# Patient Record
Sex: MALE | Race: WHITE | NOT HISPANIC OR LATINO | Employment: OTHER | URBAN - METROPOLITAN AREA
[De-identification: names, ages, dates, MRNs, and addresses within clinical notes are randomized per-mention and may not be internally consistent; named-entity substitution may affect disease eponyms.]

---

## 2017-01-24 ENCOUNTER — COMMUNICATION - HEALTHEAST (OUTPATIENT)
Dept: FAMILY MEDICINE | Facility: CLINIC | Age: 82
End: 2017-01-24

## 2017-02-14 ENCOUNTER — OFFICE VISIT - HEALTHEAST (OUTPATIENT)
Dept: NURSING | Facility: CLINIC | Age: 82
End: 2017-02-14

## 2017-02-14 ENCOUNTER — OFFICE VISIT - HEALTHEAST (OUTPATIENT)
Dept: FAMILY MEDICINE | Facility: CLINIC | Age: 82
End: 2017-02-14

## 2017-02-14 DIAGNOSIS — E11.9 TYPE 2 DIABETES MELLITUS WITHOUT COMPLICATION, WITHOUT LONG-TERM CURRENT USE OF INSULIN (H): ICD-10-CM

## 2017-02-14 DIAGNOSIS — N40.0 BENIGN PROSTATIC HYPERPLASIA, PRESENCE OF LOWER URINARY TRACT SYMPTOMS UNSPECIFIED, UNSPECIFIED MORPHOLOGY: ICD-10-CM

## 2017-02-14 DIAGNOSIS — E78.00 HYPERCHOLESTEREMIA: ICD-10-CM

## 2017-02-14 DIAGNOSIS — E11.9 DIABETES MELLITUS, TYPE II (H): ICD-10-CM

## 2017-02-14 DIAGNOSIS — J44.89 CHRONIC AIRWAY OBSTRUCTION, NOT ELSEWHERE CLASSIFIED: ICD-10-CM

## 2017-02-14 DIAGNOSIS — I25.10 CAD (CORONARY ARTERY DISEASE): ICD-10-CM

## 2017-02-14 DIAGNOSIS — I25.83 CORONARY ATHEROSCLEROSIS DUE TO LIPID RICH PLAQUE: ICD-10-CM

## 2017-02-14 DIAGNOSIS — I10 ESSENTIAL HYPERTENSION WITH GOAL BLOOD PRESSURE LESS THAN 140/90: ICD-10-CM

## 2017-02-14 LAB — HBA1C MFR BLD: 7 % (ref 3.5–6.1)

## 2017-02-14 ASSESSMENT — MIFFLIN-ST. JEOR: SCORE: 1604.79

## 2017-02-22 ENCOUNTER — COMMUNICATION - HEALTHEAST (OUTPATIENT)
Dept: FAMILY MEDICINE | Facility: CLINIC | Age: 82
End: 2017-02-22

## 2017-02-22 DIAGNOSIS — N40.0 BPH (BENIGN PROSTATIC HYPERPLASIA): ICD-10-CM

## 2017-03-11 ENCOUNTER — COMMUNICATION - HEALTHEAST (OUTPATIENT)
Dept: FAMILY MEDICINE | Facility: CLINIC | Age: 82
End: 2017-03-11

## 2017-03-11 DIAGNOSIS — N40.0 BPH (BENIGN PROSTATIC HYPERPLASIA): ICD-10-CM

## 2017-04-20 ENCOUNTER — RECORDS - HEALTHEAST (OUTPATIENT)
Dept: ADMINISTRATIVE | Facility: OTHER | Age: 82
End: 2017-04-20

## 2017-06-14 ENCOUNTER — COMMUNICATION - HEALTHEAST (OUTPATIENT)
Dept: FAMILY MEDICINE | Facility: CLINIC | Age: 82
End: 2017-06-14

## 2017-06-14 DIAGNOSIS — N40.0 BPH (BENIGN PROSTATIC HYPERPLASIA): ICD-10-CM

## 2017-06-18 RX ORDER — TAMSULOSIN HYDROCHLORIDE 0.4 MG/1
CAPSULE ORAL
Qty: 90 CAPSULE | Refills: 3 | Status: SHIPPED | OUTPATIENT
Start: 2017-06-18

## 2017-10-30 ENCOUNTER — AMBULATORY - HEALTHEAST (OUTPATIENT)
Dept: NURSING | Facility: CLINIC | Age: 82
End: 2017-10-30

## 2017-10-30 DIAGNOSIS — Z23 NEED FOR IMMUNIZATION AGAINST INFLUENZA: ICD-10-CM

## 2018-02-26 ENCOUNTER — COMMUNICATION - HEALTHEAST (OUTPATIENT)
Dept: FAMILY MEDICINE | Facility: CLINIC | Age: 83
End: 2018-02-26

## 2018-02-26 DIAGNOSIS — I25.10 CAD (CORONARY ARTERY DISEASE): ICD-10-CM

## 2018-02-26 DIAGNOSIS — N40.0 BPH (BENIGN PROSTATIC HYPERPLASIA): ICD-10-CM

## 2018-03-05 ENCOUNTER — OFFICE VISIT - HEALTHEAST (OUTPATIENT)
Dept: CARDIOLOGY | Facility: CLINIC | Age: 83
End: 2018-03-05

## 2018-03-05 DIAGNOSIS — J43.8 OTHER EMPHYSEMA (H): ICD-10-CM

## 2018-03-05 DIAGNOSIS — E78.00 HYPERCHOLESTEREMIA: ICD-10-CM

## 2018-03-05 DIAGNOSIS — I25.83 CORONARY ARTERY DISEASE DUE TO LIPID RICH PLAQUE: ICD-10-CM

## 2018-03-05 DIAGNOSIS — I25.10 CORONARY ARTERY DISEASE DUE TO LIPID RICH PLAQUE: ICD-10-CM

## 2018-03-05 DIAGNOSIS — I71.40 ANEURYSM OF ABDOMINAL VESSEL (H): ICD-10-CM

## 2018-03-05 DIAGNOSIS — I10 ESSENTIAL HYPERTENSION: ICD-10-CM

## 2018-03-05 LAB
ANION GAP SERPL CALCULATED.3IONS-SCNC: 9 MMOL/L (ref 5–18)
ATRIAL RATE - MUSE: 102 BPM
BUN SERPL-MCNC: 14 MG/DL (ref 8–28)
CALCIUM SERPL-MCNC: 8.7 MG/DL (ref 8.5–10.5)
CHLORIDE BLD-SCNC: 104 MMOL/L (ref 98–107)
CHOLEST SERPL-MCNC: 102 MG/DL
CO2 SERPL-SCNC: 25 MMOL/L (ref 22–31)
CREAT SERPL-MCNC: 0.92 MG/DL (ref 0.7–1.3)
DIASTOLIC BLOOD PRESSURE - MUSE: NORMAL MMHG
FASTING STATUS PATIENT QL REPORTED: NO
GFR SERPL CREATININE-BSD FRML MDRD: >60 ML/MIN/1.73M2
GLUCOSE BLD-MCNC: 176 MG/DL (ref 70–125)
HDLC SERPL-MCNC: 40 MG/DL
INTERPRETATION ECG - MUSE: NORMAL
LDLC SERPL CALC-MCNC: 24 MG/DL
P AXIS - MUSE: 82 DEGREES
POTASSIUM BLD-SCNC: 4.4 MMOL/L (ref 3.5–5)
PR INTERVAL - MUSE: 162 MS
QRS DURATION - MUSE: 142 MS
QT - MUSE: 392 MS
QTC - MUSE: 510 MS
R AXIS - MUSE: 78 DEGREES
SODIUM SERPL-SCNC: 138 MMOL/L (ref 136–145)
SYSTOLIC BLOOD PRESSURE - MUSE: NORMAL MMHG
T AXIS - MUSE: 11 DEGREES
TRIGL SERPL-MCNC: 189 MG/DL
VENTRICULAR RATE- MUSE: 102 BPM

## 2018-03-05 ASSESSMENT — MIFFLIN-ST. JEOR: SCORE: 1577.58

## 2018-03-06 ENCOUNTER — COMMUNICATION - HEALTHEAST (OUTPATIENT)
Dept: CARDIOLOGY | Facility: CLINIC | Age: 83
End: 2018-03-06

## 2018-03-09 ENCOUNTER — OFFICE VISIT - HEALTHEAST (OUTPATIENT)
Dept: NURSING | Facility: CLINIC | Age: 83
End: 2018-03-09

## 2018-03-09 ENCOUNTER — OFFICE VISIT - HEALTHEAST (OUTPATIENT)
Dept: FAMILY MEDICINE | Facility: CLINIC | Age: 83
End: 2018-03-09

## 2018-03-09 DIAGNOSIS — J44.9 CHRONIC OBSTRUCTIVE PULMONARY DISEASE, UNSPECIFIED COPD TYPE (H): ICD-10-CM

## 2018-03-09 DIAGNOSIS — E78.00 HYPERCHOLESTEREMIA: ICD-10-CM

## 2018-03-09 DIAGNOSIS — I25.10 CORONARY ARTERY DISEASE DUE TO LIPID RICH PLAQUE: ICD-10-CM

## 2018-03-09 DIAGNOSIS — I25.83 CORONARY ARTERY DISEASE DUE TO LIPID RICH PLAQUE: ICD-10-CM

## 2018-03-09 DIAGNOSIS — E11.9 DIABETES (H): ICD-10-CM

## 2018-03-09 DIAGNOSIS — N40.0 BENIGN PROSTATIC HYPERPLASIA, UNSPECIFIED WHETHER LOWER URINARY TRACT SYMPTOMS PRESENT: ICD-10-CM

## 2018-03-09 DIAGNOSIS — E11.9 TYPE 2 DIABETES MELLITUS WITHOUT COMPLICATION, WITHOUT LONG-TERM CURRENT USE OF INSULIN (H): ICD-10-CM

## 2018-03-09 DIAGNOSIS — L98.9 SKIN ABNORMALITIES: ICD-10-CM

## 2018-03-09 LAB — HBA1C MFR BLD: 7.8 % (ref 3.5–6.1)

## 2018-03-16 ENCOUNTER — RECORDS - HEALTHEAST (OUTPATIENT)
Dept: ADMINISTRATIVE | Facility: OTHER | Age: 83
End: 2018-03-16

## 2018-03-27 ENCOUNTER — COMMUNICATION - HEALTHEAST (OUTPATIENT)
Dept: FAMILY MEDICINE | Facility: CLINIC | Age: 83
End: 2018-03-27

## 2018-03-27 DIAGNOSIS — I25.10 CAD (CORONARY ARTERY DISEASE): ICD-10-CM

## 2018-03-27 DIAGNOSIS — N40.0 BPH (BENIGN PROSTATIC HYPERPLASIA): ICD-10-CM

## 2018-04-17 ENCOUNTER — RECORDS - HEALTHEAST (OUTPATIENT)
Dept: ADMINISTRATIVE | Facility: OTHER | Age: 83
End: 2018-04-17

## 2018-04-26 ENCOUNTER — COMMUNICATION - HEALTHEAST (OUTPATIENT)
Dept: FAMILY MEDICINE | Facility: CLINIC | Age: 83
End: 2018-04-26

## 2018-04-26 DIAGNOSIS — I25.10 CAD (CORONARY ARTERY DISEASE): ICD-10-CM

## 2018-06-22 ENCOUNTER — AMBULATORY - HEALTHEAST (OUTPATIENT)
Dept: LAB | Facility: CLINIC | Age: 83
End: 2018-06-22

## 2018-06-22 ENCOUNTER — OFFICE VISIT - HEALTHEAST (OUTPATIENT)
Dept: PHARMACY | Facility: CLINIC | Age: 83
End: 2018-06-22

## 2018-06-22 DIAGNOSIS — I25.83 CORONARY ARTERY DISEASE DUE TO LIPID RICH PLAQUE: ICD-10-CM

## 2018-06-22 DIAGNOSIS — E11.9 TYPE 2 DIABETES MELLITUS WITHOUT COMPLICATION, WITHOUT LONG-TERM CURRENT USE OF INSULIN (H): ICD-10-CM

## 2018-06-22 DIAGNOSIS — E11.9 DIABETES (H): ICD-10-CM

## 2018-06-22 DIAGNOSIS — I25.10 CORONARY ARTERY DISEASE DUE TO LIPID RICH PLAQUE: ICD-10-CM

## 2018-06-22 DIAGNOSIS — N40.0 BENIGN PROSTATIC HYPERPLASIA, UNSPECIFIED WHETHER LOWER URINARY TRACT SYMPTOMS PRESENT: ICD-10-CM

## 2018-06-22 DIAGNOSIS — E11.9 DIABETES MELLITUS (H): ICD-10-CM

## 2018-06-22 DIAGNOSIS — J44.9 CHRONIC OBSTRUCTIVE PULMONARY DISEASE, UNSPECIFIED COPD TYPE (H): ICD-10-CM

## 2018-06-22 LAB
CREAT UR-MCNC: 113 MG/DL
HBA1C MFR BLD: 7.8 % (ref 3.5–6.1)
MICROALBUMIN UR-MCNC: 4.14 MG/DL (ref 0–1.99)
MICROALBUMIN/CREAT UR: 36.6 MG/G

## 2018-06-25 LAB — HBA1C MFR BLD: NORMAL % (ref 3.5–6.1)

## 2018-12-31 ENCOUNTER — HOME CARE/HOSPICE - HEALTHEAST (OUTPATIENT)
Dept: HOSPICE | Facility: HOSPICE | Age: 83
End: 2018-12-31

## 2019-01-02 ENCOUNTER — HOME CARE/HOSPICE - HEALTHEAST (OUTPATIENT)
Dept: HOSPICE | Facility: HOSPICE | Age: 84
End: 2019-01-02

## 2019-01-04 ENCOUNTER — OFFICE VISIT - HEALTHEAST (OUTPATIENT)
Dept: GERIATRICS | Facility: CLINIC | Age: 84
End: 2019-01-04

## 2019-01-04 DIAGNOSIS — E78.00 HYPERCHOLESTEREMIA: ICD-10-CM

## 2019-01-04 DIAGNOSIS — I25.83 CORONARY ARTERY DISEASE DUE TO LIPID RICH PLAQUE: ICD-10-CM

## 2019-01-04 DIAGNOSIS — I10 ESSENTIAL HYPERTENSION: ICD-10-CM

## 2019-01-04 DIAGNOSIS — J10.1 INFLUENZA A H1N1 INFECTION: ICD-10-CM

## 2019-01-04 DIAGNOSIS — J44.1 CHRONIC OBSTRUCTIVE PULMONARY DISEASE WITH ACUTE EXACERBATION (H): ICD-10-CM

## 2019-01-04 DIAGNOSIS — I25.10 CORONARY ARTERY DISEASE DUE TO LIPID RICH PLAQUE: ICD-10-CM

## 2019-01-04 DIAGNOSIS — I47.19 ATRIAL TACHYCARDIA, MULTIFOCAL (H): ICD-10-CM

## 2019-01-04 DIAGNOSIS — R53.81 PHYSICAL DECONDITIONING: ICD-10-CM

## 2019-01-04 DIAGNOSIS — I71.40 ANEURYSM OF ABDOMINAL VESSEL (H): ICD-10-CM

## 2019-01-06 ENCOUNTER — OFFICE VISIT - HEALTHEAST (OUTPATIENT)
Dept: GERIATRICS | Facility: CLINIC | Age: 84
End: 2019-01-06

## 2019-01-06 DIAGNOSIS — J10.1 INFLUENZA A: ICD-10-CM

## 2019-01-06 DIAGNOSIS — I47.19 ATRIAL TACHYCARDIA, MULTIFOCAL (H): ICD-10-CM

## 2019-01-06 DIAGNOSIS — J44.1 CHRONIC OBSTRUCTIVE PULMONARY DISEASE WITH ACUTE EXACERBATION (H): ICD-10-CM

## 2019-01-06 DIAGNOSIS — I10 ESSENTIAL HYPERTENSION: ICD-10-CM

## 2019-01-07 ENCOUNTER — RECORDS - HEALTHEAST (OUTPATIENT)
Dept: LAB | Facility: CLINIC | Age: 84
End: 2019-01-07

## 2019-01-07 LAB
ANION GAP SERPL CALCULATED.3IONS-SCNC: 11 MMOL/L (ref 5–18)
BUN SERPL-MCNC: 18 MG/DL (ref 8–28)
CALCIUM SERPL-MCNC: 8.3 MG/DL (ref 8.5–10.5)
CHLORIDE BLD-SCNC: 105 MMOL/L (ref 98–107)
CO2 SERPL-SCNC: 24 MMOL/L (ref 22–31)
CREAT SERPL-MCNC: 0.84 MG/DL (ref 0.7–1.3)
ERYTHROCYTE [DISTWIDTH] IN BLOOD BY AUTOMATED COUNT: 14.4 % (ref 11–14.5)
GFR SERPL CREATININE-BSD FRML MDRD: >60 ML/MIN/1.73M2
GLUCOSE BLD-MCNC: 265 MG/DL (ref 70–125)
HCT VFR BLD AUTO: 46.8 % (ref 40–54)
HGB BLD-MCNC: 15.4 G/DL (ref 14–18)
MCH RBC QN AUTO: 30.3 PG (ref 27–34)
MCHC RBC AUTO-ENTMCNC: 32.9 G/DL (ref 32–36)
MCV RBC AUTO: 92 FL (ref 80–100)
PLATELET # BLD AUTO: 182 THOU/UL (ref 140–440)
PMV BLD AUTO: 10.5 FL (ref 8.5–12.5)
POTASSIUM BLD-SCNC: 3.6 MMOL/L (ref 3.5–5)
RBC # BLD AUTO: 5.09 MILL/UL (ref 4.4–6.2)
SODIUM SERPL-SCNC: 140 MMOL/L (ref 136–145)
VIT B12 SERPL-MCNC: 630 PG/ML (ref 213–816)
WBC: 13.1 THOU/UL (ref 4–11)

## 2019-01-08 ENCOUNTER — OFFICE VISIT - HEALTHEAST (OUTPATIENT)
Dept: GERIATRICS | Facility: CLINIC | Age: 84
End: 2019-01-08

## 2019-01-08 DIAGNOSIS — E11.65 TYPE 2 DIABETES MELLITUS WITH HYPERGLYCEMIA, WITHOUT LONG-TERM CURRENT USE OF INSULIN (H): ICD-10-CM

## 2019-01-08 DIAGNOSIS — R53.81 PHYSICAL DECONDITIONING: ICD-10-CM

## 2019-01-08 DIAGNOSIS — I10 ESSENTIAL HYPERTENSION: ICD-10-CM

## 2019-01-08 DIAGNOSIS — J44.1 CHRONIC OBSTRUCTIVE PULMONARY DISEASE WITH ACUTE EXACERBATION (H): ICD-10-CM

## 2019-01-08 DIAGNOSIS — J10.1 INFLUENZA A: ICD-10-CM

## 2019-01-08 LAB — 25(OH)D3 SERPL-MCNC: 9.2 NG/ML (ref 30–80)

## 2019-01-09 ENCOUNTER — OFFICE VISIT - HEALTHEAST (OUTPATIENT)
Dept: GERIATRICS | Facility: CLINIC | Age: 84
End: 2019-01-09

## 2019-01-09 DIAGNOSIS — I10 ESSENTIAL HYPERTENSION: ICD-10-CM

## 2019-01-09 DIAGNOSIS — Z51.5 PALLIATIVE CARE PATIENT: ICD-10-CM

## 2019-01-09 DIAGNOSIS — J10.1 INFLUENZA A H1N1 INFECTION: ICD-10-CM

## 2019-01-09 DIAGNOSIS — J43.8 OTHER EMPHYSEMA (H): ICD-10-CM

## 2019-01-09 DIAGNOSIS — E11.65 TYPE 2 DIABETES MELLITUS WITH HYPERGLYCEMIA, WITHOUT LONG-TERM CURRENT USE OF INSULIN (H): ICD-10-CM

## 2019-01-10 ENCOUNTER — HOME CARE/HOSPICE - HEALTHEAST (OUTPATIENT)
Dept: HOSPICE | Facility: HOSPICE | Age: 84
End: 2019-01-10

## 2019-01-11 ENCOUNTER — OFFICE VISIT - HEALTHEAST (OUTPATIENT)
Dept: GERIATRICS | Facility: CLINIC | Age: 84
End: 2019-01-11

## 2019-01-11 DIAGNOSIS — N40.1 BENIGN PROSTATIC HYPERPLASIA WITH URINARY RETENTION: ICD-10-CM

## 2019-01-11 DIAGNOSIS — I10 ESSENTIAL HYPERTENSION: ICD-10-CM

## 2019-01-11 DIAGNOSIS — R33.8 BENIGN PROSTATIC HYPERPLASIA WITH URINARY RETENTION: ICD-10-CM

## 2019-01-11 DIAGNOSIS — E11.65 TYPE 2 DIABETES MELLITUS WITH HYPERGLYCEMIA, WITHOUT LONG-TERM CURRENT USE OF INSULIN (H): ICD-10-CM

## 2019-01-11 DIAGNOSIS — R53.81 PHYSICAL DECONDITIONING: ICD-10-CM

## 2019-01-11 DIAGNOSIS — F51.02 ADJUSTMENT INSOMNIA: ICD-10-CM

## 2019-01-11 DIAGNOSIS — J44.1 CHRONIC OBSTRUCTIVE PULMONARY DISEASE WITH ACUTE EXACERBATION (H): ICD-10-CM

## 2019-01-11 DIAGNOSIS — J10.1 INFLUENZA A: ICD-10-CM

## 2019-01-16 ENCOUNTER — COMMUNICATION - HEALTHEAST (OUTPATIENT)
Dept: FAMILY MEDICINE | Facility: CLINIC | Age: 84
End: 2019-01-16

## 2019-01-16 ENCOUNTER — COMMUNICATION - HEALTHEAST (OUTPATIENT)
Dept: GERIATRICS | Facility: CLINIC | Age: 84
End: 2019-01-16

## 2019-01-16 ENCOUNTER — AMBULATORY - HEALTHEAST (OUTPATIENT)
Dept: GERIATRICS | Facility: CLINIC | Age: 84
End: 2019-01-16

## 2019-01-21 ENCOUNTER — COMMUNICATION - HEALTHEAST (OUTPATIENT)
Dept: FAMILY MEDICINE | Facility: CLINIC | Age: 84
End: 2019-01-21

## 2019-01-22 ENCOUNTER — COMMUNICATION - HEALTHEAST (OUTPATIENT)
Dept: FAMILY MEDICINE | Facility: CLINIC | Age: 84
End: 2019-01-22

## 2019-01-24 ENCOUNTER — OFFICE VISIT - HEALTHEAST (OUTPATIENT)
Dept: FAMILY MEDICINE | Facility: CLINIC | Age: 84
End: 2019-01-24

## 2019-01-24 DIAGNOSIS — I47.19 ATRIAL TACHYCARDIA, MULTIFOCAL (H): ICD-10-CM

## 2019-01-24 DIAGNOSIS — E11.65 TYPE 2 DIABETES MELLITUS WITH HYPERGLYCEMIA, WITHOUT LONG-TERM CURRENT USE OF INSULIN (H): ICD-10-CM

## 2019-01-24 DIAGNOSIS — I71.40 ANEURYSM OF ABDOMINAL VESSEL (H): ICD-10-CM

## 2019-01-24 DIAGNOSIS — I25.10 CORONARY ARTERY DISEASE DUE TO LIPID RICH PLAQUE: ICD-10-CM

## 2019-01-24 DIAGNOSIS — I25.83 CORONARY ARTERY DISEASE DUE TO LIPID RICH PLAQUE: ICD-10-CM

## 2019-01-24 DIAGNOSIS — Z92.89 HISTORY OF RECENT HOSPITALIZATION: ICD-10-CM

## 2019-01-24 DIAGNOSIS — J44.9 CHRONIC OBSTRUCTIVE PULMONARY DISEASE, UNSPECIFIED COPD TYPE (H): ICD-10-CM

## 2019-01-25 ENCOUNTER — RECORDS - HEALTHEAST (OUTPATIENT)
Dept: ADMINISTRATIVE | Facility: OTHER | Age: 84
End: 2019-01-25

## 2019-01-25 ENCOUNTER — COMMUNICATION - HEALTHEAST (OUTPATIENT)
Dept: FAMILY MEDICINE | Facility: CLINIC | Age: 84
End: 2019-01-25

## 2019-02-13 ENCOUNTER — RECORDS - HEALTHEAST (OUTPATIENT)
Dept: ADMINISTRATIVE | Facility: OTHER | Age: 84
End: 2019-02-13

## 2019-04-08 ENCOUNTER — OFFICE VISIT - HEALTHEAST (OUTPATIENT)
Dept: CARDIOLOGY | Facility: CLINIC | Age: 84
End: 2019-04-08

## 2019-04-08 DIAGNOSIS — E78.00 HYPERCHOLESTEREMIA: ICD-10-CM

## 2019-04-08 DIAGNOSIS — I10 ESSENTIAL HYPERTENSION: ICD-10-CM

## 2019-04-08 DIAGNOSIS — E11.65 TYPE 2 DIABETES MELLITUS WITH HYPERGLYCEMIA, WITHOUT LONG-TERM CURRENT USE OF INSULIN (H): ICD-10-CM

## 2019-04-08 DIAGNOSIS — F03.90 DEMENTIA WITHOUT BEHAVIORAL DISTURBANCE (H): ICD-10-CM

## 2019-04-08 DIAGNOSIS — I25.10 CORONARY ARTERY DISEASE DUE TO LIPID RICH PLAQUE: ICD-10-CM

## 2019-04-08 DIAGNOSIS — I25.83 CORONARY ARTERY DISEASE DUE TO LIPID RICH PLAQUE: ICD-10-CM

## 2019-04-08 DIAGNOSIS — I47.19 ATRIAL TACHYCARDIA, MULTIFOCAL (H): ICD-10-CM

## 2019-04-08 DIAGNOSIS — J44.9 CHRONIC OBSTRUCTIVE PULMONARY DISEASE, UNSPECIFIED COPD TYPE (H): ICD-10-CM

## 2019-04-08 DIAGNOSIS — I71.40 ANEURYSM OF ABDOMINAL VESSEL (H): ICD-10-CM

## 2019-04-08 ASSESSMENT — MIFFLIN-ST. JEOR: SCORE: 1545.82

## 2019-06-15 ENCOUNTER — COMMUNICATION - HEALTHEAST (OUTPATIENT)
Dept: FAMILY MEDICINE | Facility: CLINIC | Age: 84
End: 2019-06-15

## 2019-06-15 DIAGNOSIS — I25.10 CAD (CORONARY ARTERY DISEASE): ICD-10-CM

## 2019-06-19 RX ORDER — ATORVASTATIN CALCIUM 40 MG/1
TABLET, FILM COATED ORAL
Qty: 90 TABLET | Refills: 3 | Status: SHIPPED | OUTPATIENT
Start: 2019-06-19

## 2019-08-21 ENCOUNTER — OFFICE VISIT - HEALTHEAST (OUTPATIENT)
Dept: FAMILY MEDICINE | Facility: CLINIC | Age: 84
End: 2019-08-21

## 2019-08-21 DIAGNOSIS — Z00.00 MEDICARE ANNUAL WELLNESS VISIT, SUBSEQUENT: ICD-10-CM

## 2019-08-21 DIAGNOSIS — N40.1 BENIGN PROSTATIC HYPERPLASIA WITH URINARY RETENTION: ICD-10-CM

## 2019-08-21 DIAGNOSIS — I25.10 CORONARY ARTERY DISEASE DUE TO LIPID RICH PLAQUE: ICD-10-CM

## 2019-08-21 DIAGNOSIS — J44.9 CHRONIC OBSTRUCTIVE PULMONARY DISEASE, UNSPECIFIED COPD TYPE (H): ICD-10-CM

## 2019-08-21 DIAGNOSIS — I71.40 ANEURYSM OF ABDOMINAL VESSEL (H): ICD-10-CM

## 2019-08-21 DIAGNOSIS — I25.83 CORONARY ARTERY DISEASE DUE TO LIPID RICH PLAQUE: ICD-10-CM

## 2019-08-21 DIAGNOSIS — I47.19 ATRIAL TACHYCARDIA, MULTIFOCAL (H): ICD-10-CM

## 2019-08-21 DIAGNOSIS — E78.00 HYPERCHOLESTEREMIA: ICD-10-CM

## 2019-08-21 DIAGNOSIS — I10 ESSENTIAL HYPERTENSION: ICD-10-CM

## 2019-08-21 DIAGNOSIS — F03.90 DEMENTIA WITHOUT BEHAVIORAL DISTURBANCE, UNSPECIFIED DEMENTIA TYPE: ICD-10-CM

## 2019-08-21 DIAGNOSIS — R33.8 BENIGN PROSTATIC HYPERPLASIA WITH URINARY RETENTION: ICD-10-CM

## 2019-08-21 DIAGNOSIS — E11.65 TYPE 2 DIABETES MELLITUS WITH HYPERGLYCEMIA, WITHOUT LONG-TERM CURRENT USE OF INSULIN (H): ICD-10-CM

## 2019-08-21 LAB
ANION GAP SERPL CALCULATED.3IONS-SCNC: 9 MMOL/L (ref 5–18)
BUN SERPL-MCNC: 12 MG/DL (ref 8–28)
CALCIUM SERPL-MCNC: 8.9 MG/DL (ref 8.5–10.5)
CHLORIDE BLD-SCNC: 106 MMOL/L (ref 98–107)
CO2 SERPL-SCNC: 26 MMOL/L (ref 22–31)
CREAT SERPL-MCNC: 0.98 MG/DL (ref 0.7–1.3)
GFR SERPL CREATININE-BSD FRML MDRD: >60 ML/MIN/1.73M2
GLUCOSE BLD-MCNC: 195 MG/DL (ref 70–125)
HBA1C MFR BLD: 8 % (ref 3.5–6)
POTASSIUM BLD-SCNC: 4.7 MMOL/L (ref 3.5–5)
SODIUM SERPL-SCNC: 141 MMOL/L (ref 136–145)

## 2019-08-21 RX ORDER — BRIMONIDINE TARTRATE 1.5 MG/ML
1 SOLUTION/ DROPS OPHTHALMIC 2 TIMES DAILY
Refills: 11 | Status: SHIPPED | COMMUNITY
Start: 2019-08-04

## 2019-08-21 ASSESSMENT — MIFFLIN-ST. JEOR: SCORE: 1538.11

## 2019-08-22 ENCOUNTER — COMMUNICATION - HEALTHEAST (OUTPATIENT)
Dept: PEDIATRICS | Facility: CLINIC | Age: 84
End: 2019-08-22

## 2019-10-25 ENCOUNTER — DOCUMENTATION ONLY (OUTPATIENT)
Dept: OTHER | Facility: CLINIC | Age: 84
End: 2019-10-25

## 2019-10-25 ENCOUNTER — AMBULATORY - HEALTHEAST (OUTPATIENT)
Dept: OTHER | Facility: CLINIC | Age: 84
End: 2019-10-25

## 2020-01-10 ENCOUNTER — OFFICE VISIT - HEALTHEAST (OUTPATIENT)
Dept: GERIATRICS | Facility: CLINIC | Age: 85
End: 2020-01-10

## 2020-01-10 DIAGNOSIS — L03.114 CELLULITIS OF LEFT UPPER EXTREMITY: ICD-10-CM

## 2020-01-10 DIAGNOSIS — Z71.89 ACP (ADVANCE CARE PLANNING): ICD-10-CM

## 2020-01-10 DIAGNOSIS — F03.90 DEMENTIA WITHOUT BEHAVIORAL DISTURBANCE, UNSPECIFIED DEMENTIA TYPE: ICD-10-CM

## 2020-01-12 ENCOUNTER — RECORDS - HEALTHEAST (OUTPATIENT)
Dept: LAB | Facility: CLINIC | Age: 85
End: 2020-01-12

## 2020-01-13 ENCOUNTER — COMMUNICATION - HEALTHEAST (OUTPATIENT)
Dept: GERIATRICS | Facility: CLINIC | Age: 85
End: 2020-01-13

## 2020-01-13 LAB
ANION GAP SERPL CALCULATED.3IONS-SCNC: 7 MMOL/L (ref 5–18)
BUN SERPL-MCNC: 11 MG/DL (ref 8–28)
CALCIUM SERPL-MCNC: 8.6 MG/DL (ref 8.5–10.5)
CHLORIDE BLD-SCNC: 110 MMOL/L (ref 98–107)
CO2 SERPL-SCNC: 26 MMOL/L (ref 22–31)
CREAT SERPL-MCNC: 0.88 MG/DL (ref 0.7–1.3)
ERYTHROCYTE [DISTWIDTH] IN BLOOD BY AUTOMATED COUNT: 14.1 % (ref 11–14.5)
GFR SERPL CREATININE-BSD FRML MDRD: >60 ML/MIN/1.73M2
GLUCOSE BLD-MCNC: 144 MG/DL (ref 70–125)
HCT VFR BLD AUTO: 43.7 % (ref 40–54)
HGB BLD-MCNC: 14.2 G/DL (ref 14–18)
MAGNESIUM SERPL-MCNC: 1.9 MG/DL (ref 1.8–2.6)
MCH RBC QN AUTO: 30 PG (ref 27–34)
MCHC RBC AUTO-ENTMCNC: 32.5 G/DL (ref 32–36)
MCV RBC AUTO: 92 FL (ref 80–100)
PLATELET # BLD AUTO: 197 THOU/UL (ref 140–440)
PMV BLD AUTO: 10.3 FL (ref 8.5–12.5)
POTASSIUM BLD-SCNC: 3.8 MMOL/L (ref 3.5–5)
RBC # BLD AUTO: 4.74 MILL/UL (ref 4.4–6.2)
SODIUM SERPL-SCNC: 143 MMOL/L (ref 136–145)
WBC: 7.8 THOU/UL (ref 4–11)

## 2020-01-14 ENCOUNTER — OFFICE VISIT - HEALTHEAST (OUTPATIENT)
Dept: GERIATRICS | Facility: CLINIC | Age: 85
End: 2020-01-14

## 2020-01-14 DIAGNOSIS — E11.65 TYPE 2 DIABETES MELLITUS WITH HYPERGLYCEMIA, WITHOUT LONG-TERM CURRENT USE OF INSULIN (H): ICD-10-CM

## 2020-01-14 DIAGNOSIS — I10 ESSENTIAL HYPERTENSION: ICD-10-CM

## 2020-01-14 DIAGNOSIS — F03.90 DEMENTIA WITHOUT BEHAVIORAL DISTURBANCE, UNSPECIFIED DEMENTIA TYPE: ICD-10-CM

## 2020-01-14 DIAGNOSIS — L03.114 CELLULITIS OF LEFT UPPER EXTREMITY: ICD-10-CM

## 2020-01-16 ENCOUNTER — OFFICE VISIT - HEALTHEAST (OUTPATIENT)
Dept: GERIATRICS | Facility: CLINIC | Age: 85
End: 2020-01-16

## 2020-01-16 DIAGNOSIS — L03.114 CELLULITIS OF LEFT UPPER EXTREMITY: ICD-10-CM

## 2020-01-16 DIAGNOSIS — F03.90 DEMENTIA WITHOUT BEHAVIORAL DISTURBANCE, UNSPECIFIED DEMENTIA TYPE: ICD-10-CM

## 2020-01-20 ENCOUNTER — RECORDS - HEALTHEAST (OUTPATIENT)
Dept: LAB | Facility: CLINIC | Age: 85
End: 2020-01-20

## 2020-01-20 ENCOUNTER — OFFICE VISIT - HEALTHEAST (OUTPATIENT)
Dept: GERIATRICS | Facility: CLINIC | Age: 85
End: 2020-01-20

## 2020-01-20 DIAGNOSIS — F03.90 DEMENTIA WITHOUT BEHAVIORAL DISTURBANCE, UNSPECIFIED DEMENTIA TYPE: ICD-10-CM

## 2020-01-20 DIAGNOSIS — L03.114 CELLULITIS OF LEFT UPPER EXTREMITY: ICD-10-CM

## 2020-01-20 DIAGNOSIS — I10 ESSENTIAL HYPERTENSION: ICD-10-CM

## 2020-01-20 RX ORDER — METOPROLOL SUCCINATE 25 MG/1
12.5 TABLET, EXTENDED RELEASE ORAL DAILY
Status: SHIPPED | COMMUNITY
Start: 2020-01-20

## 2020-01-21 LAB
ANION GAP SERPL CALCULATED.3IONS-SCNC: 6 MMOL/L (ref 5–18)
BUN SERPL-MCNC: 14 MG/DL (ref 8–28)
CALCIUM SERPL-MCNC: 9 MG/DL (ref 8.5–10.5)
CHLORIDE BLD-SCNC: 106 MMOL/L (ref 98–107)
CO2 SERPL-SCNC: 28 MMOL/L (ref 22–31)
CREAT SERPL-MCNC: 1.03 MG/DL (ref 0.7–1.3)
GFR SERPL CREATININE-BSD FRML MDRD: >60 ML/MIN/1.73M2
GLUCOSE BLD-MCNC: 186 MG/DL (ref 70–125)
POTASSIUM BLD-SCNC: 3.9 MMOL/L (ref 3.5–5)
SODIUM SERPL-SCNC: 140 MMOL/L (ref 136–145)

## 2020-01-22 ENCOUNTER — OFFICE VISIT - HEALTHEAST (OUTPATIENT)
Dept: GERIATRICS | Facility: CLINIC | Age: 85
End: 2020-01-22

## 2020-01-22 DIAGNOSIS — F03.90 DEMENTIA WITHOUT BEHAVIORAL DISTURBANCE, UNSPECIFIED DEMENTIA TYPE: ICD-10-CM

## 2020-01-22 DIAGNOSIS — L03.114 CELLULITIS OF LEFT UPPER EXTREMITY: ICD-10-CM

## 2020-01-24 ENCOUNTER — COMMUNICATION - HEALTHEAST (OUTPATIENT)
Dept: FAMILY MEDICINE | Facility: CLINIC | Age: 85
End: 2020-01-24

## 2020-01-24 ENCOUNTER — AMBULATORY - HEALTHEAST (OUTPATIENT)
Dept: GERIATRICS | Facility: CLINIC | Age: 85
End: 2020-01-24

## 2020-01-27 ENCOUNTER — COMMUNICATION - HEALTHEAST (OUTPATIENT)
Dept: FAMILY MEDICINE | Facility: CLINIC | Age: 85
End: 2020-01-27

## 2020-01-27 ENCOUNTER — RECORDS - HEALTHEAST (OUTPATIENT)
Dept: ADMINISTRATIVE | Facility: OTHER | Age: 85
End: 2020-01-27

## 2020-02-03 ENCOUNTER — COMMUNICATION - HEALTHEAST (OUTPATIENT)
Dept: FAMILY MEDICINE | Facility: CLINIC | Age: 85
End: 2020-02-03

## 2020-02-17 ENCOUNTER — COMMUNICATION - HEALTHEAST (OUTPATIENT)
Dept: FAMILY MEDICINE | Facility: CLINIC | Age: 85
End: 2020-02-17

## 2020-02-26 ENCOUNTER — RECORDS - HEALTHEAST (OUTPATIENT)
Dept: ADMINISTRATIVE | Facility: OTHER | Age: 85
End: 2020-02-26

## 2020-06-30 ENCOUNTER — RECORDS - HEALTHEAST (OUTPATIENT)
Dept: LAB | Facility: CLINIC | Age: 85
End: 2020-06-30

## 2020-06-30 LAB
ALBUMIN UR-MCNC: ABNORMAL MG/DL
APPEARANCE UR: ABNORMAL
BACTERIA #/AREA URNS HPF: ABNORMAL HPF
BILIRUB UR QL STRIP: NEGATIVE
CAOX CRY #/AREA URNS HPF: PRESENT /[HPF]
COLOR UR AUTO: YELLOW
GLUCOSE UR STRIP-MCNC: ABNORMAL MG/DL
GRAN CASTS #/AREA URNS LPF: ABNORMAL LPF
HGB UR QL STRIP: NEGATIVE
KETONES UR STRIP-MCNC: NEGATIVE MG/DL
LEUKOCYTE ESTERASE UR QL STRIP: ABNORMAL
MUCOUS THREADS #/AREA URNS LPF: ABNORMAL LPF
NITRATE UR QL: NEGATIVE
PH UR STRIP: 6.5 [PH] (ref 4.5–8)
RBC #/AREA URNS AUTO: ABNORMAL HPF
SP GR UR STRIP: 1.02 (ref 1–1.03)
SQUAMOUS #/AREA URNS AUTO: ABNORMAL LPF
TRANS CELLS #/AREA URNS HPF: ABNORMAL LPF
UROBILINOGEN UR STRIP-ACNC: ABNORMAL
WBC #/AREA URNS AUTO: ABNORMAL HPF
WBC CLUMPS #/AREA URNS HPF: PRESENT /[HPF]

## 2020-07-01 LAB — BACTERIA SPEC CULT: NO GROWTH

## 2020-07-02 ENCOUNTER — RECORDS - HEALTHEAST (OUTPATIENT)
Dept: LAB | Facility: CLINIC | Age: 85
End: 2020-07-02

## 2020-07-02 LAB
ANION GAP SERPL CALCULATED.3IONS-SCNC: 12 MMOL/L (ref 5–18)
BASOPHILS # BLD AUTO: 0 THOU/UL (ref 0–0.2)
BASOPHILS NFR BLD AUTO: 1 % (ref 0–2)
BUN SERPL-MCNC: 11 MG/DL (ref 8–28)
CALCIUM SERPL-MCNC: 8.5 MG/DL (ref 8.5–10.5)
CHLORIDE BLD-SCNC: 105 MMOL/L (ref 98–107)
CO2 SERPL-SCNC: 23 MMOL/L (ref 22–31)
CREAT SERPL-MCNC: 0.85 MG/DL (ref 0.7–1.3)
EOSINOPHIL # BLD AUTO: 0.1 THOU/UL (ref 0–0.4)
EOSINOPHIL NFR BLD AUTO: 2 % (ref 0–6)
ERYTHROCYTE [DISTWIDTH] IN BLOOD BY AUTOMATED COUNT: 14.6 % (ref 11–14.5)
GFR SERPL CREATININE-BSD FRML MDRD: >60 ML/MIN/1.73M2
GLUCOSE BLD-MCNC: 233 MG/DL (ref 70–125)
HBA1C MFR BLD: 8 %
HCT VFR BLD AUTO: 46 % (ref 40–54)
HGB BLD-MCNC: 14.6 G/DL (ref 14–18)
LYMPHOCYTES # BLD AUTO: 1.5 THOU/UL (ref 0.8–4.4)
LYMPHOCYTES NFR BLD AUTO: 20 % (ref 20–40)
MCH RBC QN AUTO: 29.1 PG (ref 27–34)
MCHC RBC AUTO-ENTMCNC: 31.7 G/DL (ref 32–36)
MCV RBC AUTO: 92 FL (ref 80–100)
MONOCYTES # BLD AUTO: 0.6 THOU/UL (ref 0–0.9)
MONOCYTES NFR BLD AUTO: 8 % (ref 2–10)
NEUTROPHILS # BLD AUTO: 4.9 THOU/UL (ref 2–7.7)
NEUTROPHILS NFR BLD AUTO: 69 % (ref 50–70)
PLATELET # BLD AUTO: 168 THOU/UL (ref 140–440)
PMV BLD AUTO: 10.7 FL (ref 8.5–12.5)
POTASSIUM BLD-SCNC: 3.2 MMOL/L (ref 3.5–5)
RBC # BLD AUTO: 5.02 MILL/UL (ref 4.4–6.2)
SODIUM SERPL-SCNC: 140 MMOL/L (ref 136–145)
WBC: 7.1 THOU/UL (ref 4–11)

## 2020-07-24 ENCOUNTER — RECORDS - HEALTHEAST (OUTPATIENT)
Dept: LAB | Facility: CLINIC | Age: 85
End: 2020-07-24

## 2020-07-27 LAB — POTASSIUM BLD-SCNC: 4 MMOL/L (ref 3.5–5)

## 2021-02-10 ENCOUNTER — RECORDS - HEALTHEAST (OUTPATIENT)
Dept: LAB | Facility: CLINIC | Age: 86
End: 2021-02-10

## 2021-02-12 LAB
ANION GAP SERPL CALCULATED.3IONS-SCNC: 7 MMOL/L (ref 5–18)
BUN SERPL-MCNC: 14 MG/DL (ref 8–28)
CALCIUM SERPL-MCNC: 8.2 MG/DL (ref 8.5–10.5)
CHLORIDE BLD-SCNC: 110 MMOL/L (ref 98–107)
CO2 SERPL-SCNC: 25 MMOL/L (ref 22–31)
CREAT SERPL-MCNC: 0.85 MG/DL (ref 0.7–1.3)
ERYTHROCYTE [DISTWIDTH] IN BLOOD BY AUTOMATED COUNT: 16.2 % (ref 11–14.5)
GFR SERPL CREATININE-BSD FRML MDRD: >60 ML/MIN/1.73M2
GLUCOSE BLD-MCNC: 117 MG/DL (ref 70–125)
HBA1C MFR BLD: 6.4 %
HCT VFR BLD AUTO: 44.3 % (ref 40–54)
HGB BLD-MCNC: 14.1 G/DL (ref 14–18)
MCH RBC QN AUTO: 28.9 PG (ref 27–34)
MCHC RBC AUTO-ENTMCNC: 31.8 G/DL (ref 32–36)
MCV RBC AUTO: 91 FL (ref 80–100)
PLATELET # BLD AUTO: 181 THOU/UL (ref 140–440)
PMV BLD AUTO: 10.6 FL (ref 8.5–12.5)
POTASSIUM BLD-SCNC: 4.3 MMOL/L (ref 3.5–5)
RBC # BLD AUTO: 4.88 MILL/UL (ref 4.4–6.2)
SODIUM SERPL-SCNC: 142 MMOL/L (ref 136–145)
WBC: 7.3 THOU/UL (ref 4–11)

## 2021-05-27 ENCOUNTER — RECORDS - HEALTHEAST (OUTPATIENT)
Dept: ADMINISTRATIVE | Facility: CLINIC | Age: 86
End: 2021-05-27

## 2021-05-27 NOTE — PATIENT INSTRUCTIONS - HE
Mr Delta COLEMAN Holly,  I enjoyed visiting with you again today.  I am glad to hear you are doing well.  Per our conversation review this list and call us at [phone] if not accurate.  I will plan on seeing you 1 year or sooner if needed.  Onesimo Ortega

## 2021-05-27 NOTE — PROGRESS NOTES
[facility] Follow-up Note    Assessment & Plan        1. Coronary artery disease due to lipid rich plaque-angiography November 2012 showed a normal left main, mid left anterior descending had an 85% lesion. Circumflex and right coronary artery had minimal disease. He underwent bare metal stent to the mid LAD and is getting along well.  Given his increased shortness of breath I wanted to proceed with stress testing looking for further ischemia.  His wife has declined this and thus we are considering him medical therapy and conservative therapy, with symptoms essentially unchanged.    2. Hypertension -under good control on diltiazem, but given his recent fall concerned about orthostasis and might need to back off on this.   3. Hypercholesteremia -LDL excellent at 24 on current statin therapy.   4. Aneurysm Of The Abdominal Aorta -March 2014 this was 5.9 cm wide and 13 cm long.  Last year I discussed rechecking this with CAT scan and his wife declined.  She feels the same way now and continue conservative therapy.   5. Atrial tachycardia, multifocal (H) -no need for anticoagulation and continue diltiazem.   6. Type 2 diabetes mellitus with hyperglycemia, without long-term current use of insulin (H) -no specific medications and hemoglobin A1c 7.8 in June 2018.   7. Dementia without behavioral disturbance -defer to primary and wonder if there is an element of parkinsonism.   8. Chronic obstructive pulmonary disease, unspecified COPD type (H) -stable at this point time without any nebulizers at home.   9.  Obesity-weight loss.    Plan  1.  Continue current medications.  2.  If patient has frequent more falls will need to back off on diltiazem.  3.  Follow-up me one year or sooner if needed.    Subjective  CC: 84-year-old white gentleman being seen in a yearly follow-up today.  Since I seen him he was hospitalized due to influenza with multifocal atrial tachycardia and COPD acute exacerbation.  He is  "still living at home independently with his wife.  He recently had a fall in the bathroom, he does not remember it but apparently he slumped after going to the bathroom.  There is no chest discomfort, palpitations, significant shortness of breath, PND, orthopnea or peripheral edema.  He is pleased with his status and so is his wife who cares for him and does not want to pursue any other aggressive therapy.    Medications  Current Outpatient Medications   Medication Sig     aspirin 81 MG EC tablet Take 81 mg by mouth daily.     atorvastatin (LIPITOR) 40 MG tablet TAKE 1 TABLET BY MOUTH ONCE DAILY     cholecalciferol, vitamin D3, 1,000 unit tablet Take 3,000 Units by mouth daily.     diltiazem (DILACOR XR) 120 MG 24 hr capsule Take 1 capsule (120 mg total) by mouth daily.     dorzolamide-timolol (COSOPT) 22.3-6.8 mg/mL ophthalmic solution Administer 1 drop to both eyes 2 (two) times a day.     finasteride (PROSCAR) 5 mg tablet Take 1 tablet (5 mg total) by mouth daily. Prescribed by Dr. Manuel, Urology     latanoprost (XALATAN) 0.005 % ophthalmic solution Administer 1 drop to both eyes at bedtime.     tamsulosin (FLOMAX) 0.4 mg Cp24 TAKE ONE CAPSULE BY MOUTH ONCE DAILY       Objective  /80 (Patient Site: Left Arm, Patient Position: Sitting, Cuff Size: Adult Large)   Pulse 72   Resp 18   Ht 5' 7\" (1.702 m)   Wt 200 lb (90.7 kg)   BMI 31.32 kg/m      General Appearance:    Alert, cooperative, no distress, appears stated age   Head:    Normocephalic, without obvious abnormality, atraumatic   Throat:   Lips, mucosa, and tongue normal; teeth and gums normal   Neck:   Supple, symmetrical, trachea midline, no adenopathy;        thyroid:  No enlargement/tenderness/nodules; no carotid    bruit or JVD   Back:     Symmetric, no curvature, ROM normal, no CVA tenderness   Lungs:     Clear to auscultation bilaterally, respirations unlabored   Chest wall:    No tenderness or deformity   Heart:    Regular rate and rhythm, " S1 and S2 normal, no murmur, rub   or gallop   Abdomen:     Soft, non-tender, bowel sounds active all four quadrants,     no masses, no organomegaly   Extremities:   Normal, atraumatic, no cyanosis or edema   Pulses:   2+ and symmetric all extremities   Skin:   Skin color, texture, turgor normal, no rashes or lesions     Results    Lab Results personally reviewed   Lab Results   Component Value Date    CHOL 102 03/05/2018    CHOL 88 10/27/2016     Lab Results   Component Value Date    HDL 40 03/05/2018    HDL 40 10/27/2016     Lab Results   Component Value Date    LDLCALC 24 03/05/2018    LDLCALC 31 10/27/2016     Lab Results   Component Value Date    TRIG 189 (H) 03/05/2018    TRIG 86 10/27/2016     Lab Results   Component Value Date    WBC 13.1 (H) 01/07/2019    HGB 15.4 01/07/2019    HCT 46.8 01/07/2019     01/07/2019     Lab Results   Component Value Date    CREATININE 0.84 01/07/2019    BUN 18 01/07/2019     01/07/2019    K 3.6 01/07/2019    CO2 24 01/07/2019     Review of Systems:   General: WNL  Eyes: WNL  Ears/Nose/Throat: WNL  Lungs: Shortness of Breath, Wheezing  Heart: Shortness of Breath with activity  Stomach: WNL  Bladder: WNL  Muscle/Joints: Muscle Weakness  Skin: WNL  Nervous System: Daytime Sleepiness, Loss of Balance  Mental Health: Confusion     Blood: WNL

## 2021-05-29 NOTE — TELEPHONE ENCOUNTER
Former patient of David & has not established care with another provider.  Please assign refill request to covering provider per Clinic standard process.      Refill Approved    Rx renewed per Medication Renewal Policy. Medication was last renewed on 4/26/18.    Hillary Brar, Care Connection Triage/Med Refill 6/17/2019     Requested Prescriptions   Pending Prescriptions Disp Refills     atorvastatin (LIPITOR) 40 MG tablet [Pharmacy Med Name: ATORVASTATIN 40MG   TAB] 90 tablet 3     Sig: TAKE 1 TABLET BY MOUTH ONCE DAILY       Statins Refill Protocol (Hmg CoA Reductase Inhibitors) Passed - 6/15/2019 10:45 AM        Passed - PCP or prescribing provider visit in past 12 months      Last office visit with prescriber/PCP: 3/9/2018 Cortney Hernandez NP OR same dept: 1/24/2019 Estefanía Plaza MD OR same specialty: 1/24/2019 Estefanía Plaza MD  Last physical: Visit date not found Last MTM visit: Visit date not found   Next visit within 3 mo: Visit date not found  Next physical within 3 mo: Visit date not found  Prescriber OR PCP: Cortney Hernandez NP  Last diagnosis associated with med order: 1. CAD (coronary artery disease)  - atorvastatin (LIPITOR) 40 MG tablet [Pharmacy Med Name: ATORVASTATIN 40MG   TAB]; TAKE 1 TABLET BY MOUTH ONCE DAILY  Dispense: 90 tablet; Refill: 3    If protocol passes may refill for 12 months if within 3 months of last provider visit (or a total of 15 months).

## 2021-05-29 NOTE — TELEPHONE ENCOUNTER
Refill request for medication: atovastatin   Last visit addressing this medication: Establish care with Dr. Plaza on 1/24/2019  Follow up plan 3  months  Last refill on 4/26/2018 , quantity #90 with 3 refills     Appointment: contacted and scheduled for an AWV on 8/21/19 with Dr. Mela Carey LPN

## 2021-05-30 VITALS — BODY MASS INDEX: 33.43 KG/M2 | HEIGHT: 67 IN | WEIGHT: 213 LBS

## 2021-05-31 NOTE — PROGRESS NOTES
"Assessment and Plan:   84-year-old male with multiple medical conditions but declining most medical therapies who was seen for wellness visit and follow-up of his chronic conditions today.    Medicare annual wellness visit, subsequent  - Home safety information was reviewed with the patient.  We discussed prevention of fall, causes of falls, regular checkup with vision and hearing, be mindful about over-the-counter medications and their side effects, using any assisted walking devices, adequate shoes and feet wear, avoiding loose rugs or items on the floor that may cause the patient to trip, safety bars in the bathtub, shower, toilet area, keeping the body in good shape with regular exercise especially walking, limiting alcohol intake.  - Social history was reviewed with the patient today.    - Patient is DEPENDENT with activities of daily living and some IADLS; his spouse performs these tasks and declines options for PCA or other respite care/support  - We reviewed active symptoms and discussed management  - We reviewed list of healthcare providers for this patient. Attempt to obtain K[facility] for eye clinic was unsuccessful but he has a visit coming up in December and was recently seen in June.  - We also reviewed PHQ 9  - Glycosylated Hemoglobin A1c  - Basic Metabolic Panel  - Pneumococcal conjugate vaccine 13-valent 6wks-17yrs; >50yrs    Dementia without behavioral disturbance, unspecified dementia type  Parkinsonism type tendencies on exam, patient and his wife declined further evaluation and intervention of this.  His wife performs assistance with his ADLs and IADLs.  Did recommend kel chi for balance and fall risk prevention.    Chronic obstructive pulmonary disease, unspecified COPD type (H)  Patient's and his wife declined use of maintenance inhaler for COPD management.  They feel very much he is going to \"wait for what ever got has in store for me \"and do not find the inhalers helpful and too " expensive.  Discussed use of albuterol at least if needed for symptomatic improvement.  Patient is currently doing well however.  -PCV 13 today     Atrial tachycardia, multifocal (H), resolved.   Cardiology note indicates he had been taking diltiazem, however patient and his wife were both very confused about this and he has actually not been taking this.  They also further decline in continuation of this medication as he has not been symptomatic and they do not see reason.  I did have a long risk-benefit conversation about this.  I do have some suspicion that the wife has worsening of her memory as well.    Type 2 diabetes mellitus with hyperglycemia, without long-term current use of insulin (H)  A1c is was meeting goal of less than 8% given his age, without use of medication.   - A1c today 8%, ok to continue as is off meds as is family's preference    HTN, Coronary artery disease due to lipid rich plaque  Blood pressure is well controlled and meeting goal of <140/90 mm Hg per JNC-8 hypertension guidelines.   Cont statin    Aneurysm Of The Abdominal Aorta  US from March 2014 showed this was 5.9 cm wide and 13 cm long.  Patient and his wife declined rechecking with a CAT scan.   She is states recognition for the risk.     Benign prostatic hyperplasia  Well controlled on medication regimen of flomax. Prescribed by Dr. Manuel, Urology      The patient's current medical problems were reviewed.    I have had an Advance Directives discussion with the patient.  The following health maintenance schedule was reviewed with the patient and provided in printed form in the after visit summary:   Health Maintenance   Topic Date Due     ZOSTER VACCINES (1 of 2) 09/24/1984     MEDICARE ANNUAL WELLNESS VISIT  09/24/1999     PNEUMOCOCCAL CONJUGATE VACCINE FOR ADULTS (PCV13 OR PREVNAR)  09/24/1999     DIABETES FOLLOW-UP  09/09/2018     DIABETES HEMOGLOBIN A1C  12/22/2018     DIABETES FOOT EXAM  03/09/2019     FALL RISK ASSESSMENT   "03/09/2019     DIABETES OPHTHALMOLOGY EXAM  04/17/2019     DIABETES URINE MICROALBUMIN  06/22/2019     INFLUENZA VACCINE RULE BASED (1) 08/01/2019     TD 18+ HE  09/10/2022     ADVANCE CARE PLANNING  03/09/2023     PNEUMOCOCCAL POLYSACCHARIDE VACCINE AGE 65 AND OVER  Completed          I spent 15 minutes with the patient, >50% of which was in counseling regarding patient's medical issues as noted above aside from wellness topics reviewed as part of AWV.  Estefanía Plaza MD      Subjective:   Chief Complaint: Delta Barrera is an 84 y.o. male here for an Annual Wellness visit.     HPI: No specific concerns.    Had a fall 6 weeks ago. Mechanical fall by his report (\"tripped over a rug\" but h/o poor memory.     Memory/dependencies: has dementia which has not been formally evaluated, he and his wife are not interested in pursuing further work up or evaluation with neurology.  No behavioral disturbances.   Wife states she herself has some decline in her memory but she keeps the house so cleaned \"you could eat food off the floor \"and provides all his support for ADLs including getting dressed, using the toilet, grooming and bathing.  She does all the laundry, housekeeping, banking and transportation and shopping.  She also sets up his pillbox and they also have a chart to know when to use the eyedrops.  She cooks all the food.    She states she is doing well with the caregiver role and declines desire for PCA or other supports.    COPD: has albuterol but not interested in using this nor maintence inhalers. Denies dyspnea unless walking long distances.    CAD, atrial tachycardia hx: Last saw cardiology 4/8/2019.  Note reviewed.  Has not actually been on the diltiazem despite what last cardiology note from April indicates. No fills since Jan. No rapid heart beats.  Does not require anticoagulation per cardiology.  Declines reevaluation of cholesterol levels.  Patient states he is not having any chest pain, " lightheadedness, dizziness, occasions nor racing heartbeat..  He did have one fall as described above.     BPH: nly using flomax only for his BPH. No longer on finasteride.     Dr. Valencia is his ophthalmologist, with [place] [facility].  Has an appointment coming up in December and was recently seen in July.  We do not have these latest notes.        Review of Systems:   Please see above.  The rest of the review of systems are negative for all systems.    Patient Care Team:  Estefanía Plaza MD as PCP - General (Family Medicine)  María Nagy PharmD as Pharmacist (Pharmacist)     Patient Active Problem List   Diagnosis     Hypertension     Coronary artery disease due to lipid rich plaque     Hypercholesteremia     Aneurysm Of The Abdominal Aorta     COPD (chronic obstructive pulmonary disease) (H)     Atrial tachycardia, multifocal (H)     Type 2 diabetes mellitus with hyperglycemia, without long-term current use of insulin (H)     Benign prostatic hyperplasia with urinary retention     Dementia without behavioral disturbance     Past Medical History:   Diagnosis Date     COPD (chronic obstructive pulmonary disease) (H)     Created by Conversion      Dementia      Diverticulosis     Created by Conversion  Replacement Utility updated for latest IMO load     Goals of care, counseling/discussion      Hypercholesteremia     Created by Conversion      Hypertension     Created by Conversion  Replacement Utility updated for latest IMO load     Influenza A H1N1 infection 12/28/2018      Past Surgical History:   Procedure Laterality Date     MN HEMORRHOIDECTOMY INTERNAL RUBBER BAND LIGATIONS      Description: Hemorrhoidectomy;  Recorded: 05/29/2013;  Comments: September 2004- 2 quadrant  hemmoroidectomy and proctoplasty of the rectal mucosa prolapse.     MN REMOVAL OF TONSILS,<13 Y/O      Description: Tonsillectomy;  Recorded: 09/10/2012;  Comments: tosilectomy as a child     MN REMOVE BLADDER STONE,<2.5 CM       Description: Cystoscopy With Fragmentation Of Bladder Calculus;  Recorded: 2013;  Comments: May 2010 by Dr. Florez at [facility] ; ; 2012 by Dr. Garner At [facility]      No family history on file.   Social History     Socioeconomic History     Marital status:      Spouse name: Not on file     Number of children: Not on file     Years of education: Not on file     Highest education level: Not on file   Occupational History     Not on file   Social Needs     Financial resource strain: Not on file     Food insecurity:     Worry: Not on file     Inability: Not on file     Transportation needs:     Medical: Not on file     Non-medical: Not on file   Tobacco Use     Smoking status: Former Smoker     Packs/day: 2.00     Years: 45.00     Pack years: 90.00     Last attempt to quit: 2003     Years since quittin.1     Smokeless tobacco: Never Used   Substance and Sexual Activity     Alcohol use: Not on file     Drug use: Not on file     Sexual activity: Not on file   Lifestyle     Physical activity:     Days per week: Not on file     Minutes per session: Not on file     Stress: Not on file   Relationships     Social connections:     Talks on phone: Not on file     Gets together: Not on file     Attends [employer] service: Not on file     Active member of club or organization: Not on file     Attends meetings of clubs or organizations: Not on file     Relationship status: Not on file     Intimate partner violence:     Fear of current or ex partner: Not on file     Emotionally abused: Not on file     Physically abused: Not on file     Forced sexual activity: Not on file   Other Topics Concern     Not on file   Social History Narrative     54 years. Many children. Lives in [facility] single [facility].     Former smoker.       Current Outpatient Medications   Medication Sig Dispense Refill     aspirin 81 MG EC tablet Take 81 mg by mouth daily.       atorvastatin  (LIPITOR) 40 MG tablet TAKE 1 TABLET BY MOUTH ONCE DAILY 90 tablet 3     cholecalciferol, vitamin D3, 1,000 unit tablet Take 3,000 Units by mouth daily.       diltiazem (DILACOR XR) 120 MG 24 hr capsule Take 1 capsule (120 mg total) by mouth daily.  0     dorzolamide-timolol (COSOPT) 22.3-6.8 mg/mL ophthalmic solution Administer 1 drop to both eyes 2 (two) times a day.       finasteride (PROSCAR) 5 mg tablet Take 1 tablet (5 mg total) by mouth daily. Prescribed by Dr. Manuel, Urology 30 tablet 2     latanoprost (XALATAN) 0.005 % ophthalmic solution Administer 1 drop to both eyes at bedtime.       tamsulosin (FLOMAX) 0.4 mg Cp24 TAKE ONE CAPSULE BY MOUTH ONCE DAILY 90 capsule 3     No current facility-administered medications for this visit.       Objective:   Vital Signs: There were no vitals taken for this visit.     VisionScreening:  No exam data present     PHYSICAL EXAM  Physical Exam:  Constitutional: Patient is oriented to person, place, and time. Patient appears well-developed and well-nourished. No distress.   Head: Normocephalic and atraumatic.   Ears: TMs normal bilaterally   Nose: no discharge or polyps  Mouth/Throat: Oropharynx is clear and moist. No oropharyngeal exudate.   Eyes: Conjunctivae and EOM are normal. Pupils are equal, round, and reactive to light. No scleral icterus or discharge.  Neck: Neck supple. No JVD present. No tracheal deviation. No thyromegaly.     Cardiovascular: Normal rate, regular rhythm, normal heart sounds and intact distal pulses. No murmur heard.   Pulmonary/Chest: Effort normal and breath sounds are clear to auscultation bilaterally.  Abdominal: Soft. Bowel sounds are normal. Nodistension and no mass.  There is no tenderness.   Lymphadenopathy:  No cervical adenopathy.   Neurological: Alert and oriented to person, place, and time. 2+ patellar DTRs.  Wide based gait.  Some rigidity and bradykinesia of his extremities..   Skin: Skin is warm and dry. No rash noted.    Psychiatric:  Normal mood and affect. Judgment and thought content normal.   Speech is regular rate and rhythm.       Assessment Results 8/21/2019   Activities of Daily Living 5-6 - Severe functional impairment   Instrumental Activities of Daily Living 5-6 - Severe functional impairment   Get Up and Go Score 12 seconds or more   Mini Cog Total Score 2   Some recent data might be hidden       A Mini-Cog score of 0-2 suggests the possibility of dementia, score of 3-5 suggests no dementia    Identified Health Risks:   Memory decline, fall risk, diet controlled diabetes, CAD/atrial tachycardia.

## 2021-06-01 VITALS — BODY MASS INDEX: 32.49 KG/M2 | WEIGHT: 207 LBS | HEIGHT: 67 IN

## 2021-06-01 VITALS — BODY MASS INDEX: 32.58 KG/M2 | WEIGHT: 208 LBS

## 2021-06-02 ENCOUNTER — RECORDS - HEALTHEAST (OUTPATIENT)
Dept: ADMINISTRATIVE | Facility: CLINIC | Age: 86
End: 2021-06-02

## 2021-06-02 VITALS — WEIGHT: 200 LBS | HEIGHT: 67 IN | BODY MASS INDEX: 31.39 KG/M2

## 2021-06-02 VITALS — BODY MASS INDEX: 31.48 KG/M2 | WEIGHT: 201 LBS

## 2021-06-02 VITALS — WEIGHT: 201 LBS | BODY MASS INDEX: 31.48 KG/M2

## 2021-06-03 VITALS — WEIGHT: 198.3 LBS | BODY MASS INDEX: 31.12 KG/M2 | HEIGHT: 67 IN

## 2021-06-04 VITALS
BODY MASS INDEX: 31.42 KG/M2 | SYSTOLIC BLOOD PRESSURE: 133 MMHG | HEART RATE: 76 BPM | DIASTOLIC BLOOD PRESSURE: 80 MMHG | WEIGHT: 200.6 LBS | TEMPERATURE: 97.2 F

## 2021-06-04 VITALS
BODY MASS INDEX: 30.76 KG/M2 | SYSTOLIC BLOOD PRESSURE: 144 MMHG | HEART RATE: 73 BPM | DIASTOLIC BLOOD PRESSURE: 96 MMHG | TEMPERATURE: 96.1 F | WEIGHT: 196.4 LBS

## 2021-06-04 VITALS
TEMPERATURE: 97.6 F | HEART RATE: 78 BPM | BODY MASS INDEX: 30.73 KG/M2 | SYSTOLIC BLOOD PRESSURE: 130 MMHG | DIASTOLIC BLOOD PRESSURE: 78 MMHG | WEIGHT: 196.2 LBS

## 2021-06-04 VITALS
BODY MASS INDEX: 30.74 KG/M2 | SYSTOLIC BLOOD PRESSURE: 130 MMHG | DIASTOLIC BLOOD PRESSURE: 74 MMHG | WEIGHT: 196.3 LBS | HEART RATE: 66 BPM | TEMPERATURE: 98.6 F

## 2021-06-05 NOTE — TELEPHONE ENCOUNTER
Orders being requested: Diana aguilera from [facility] requesting a verbal order for Deltabruce Olivastrenton from Dr. Estefanía Plaza for Occupational Therapy 1 time a week for 1week and 2x's a week for 1 week for left shoulder pain   Reason service is needed/diagnosis: Arthritis and injury combined of shoulder   When are orders needed by: Verbal ASAP needs it before 2/5 Wednesday as he is scheduled for services then   Where to send Orders: Per protocol verbal  Okay to leave detailed message?  Yes

## 2021-06-05 NOTE — PROGRESS NOTES
"  [facility]      NAME:  Delta Barrera             :  1934    MRN: [id number]    CODE STATUS:  FULL CODE    FACILITY: [facility] [[id number]]         CHIEF COMPLAIN/REASON FOR VISIT:  Chief Complaint   Patient presents with     Review Of Multiple Medical Conditions       HISTORY OF PRESENT ILLNESS: Delta Barrera is a 85 y.o. male being seen today for review of multiple medical conditions. He comes from the [facility] after a stay from  to  where he presented due to a cellulitis to left arm.  Per EMR \"85 y old male with history of dementia, atrial fibrillation, emphysema, type 2 diabetes, hypertension, coronary artery disease, hyperlipidemia and BPH presented with progressive erythema of left upper extremity, started after the injury to his left elbow following a mechanical fall few days before arrival.  He was diagnosed with cellulitis, started on cefazolin. Responded well, he was eventually discharged with Keflex. Betamethasone topical added to her regimen for dermatitis and itchy skin of his forearm \". Although he has dementia he is awake and alert, able to answer most questions. Asked where he lived before hospitalization, thought he lived in [facility] but wasn't sure. He does know he is at [place].Wife here today and we discussed dc plans. She does have plans of pt going to LTC upon dc. She has been working with the  for placement.       No Known Allergies:     Current Outpatient Medications   Medication Sig     aspirin 81 MG EC tablet Take 81 mg by mouth daily.     atorvastatin (LIPITOR) 40 MG tablet TAKE 1 TABLET BY MOUTH ONCE DAILY     betamethasone dipropionate (DIPROLENE) 0.05 % cream Apply topically 2 (two) times a day for 7 days. Apply to the red area of left forearm for a week     brimonidine (ALPHAGAN) 0.15 % ophthalmic solution Apply 1 drop to eye 2 (two) times a day.     cephalexin (KEFLEX) 500 MG capsule Take 1 capsule (500 mg total) " by mouth 4 (four) times a day for 10 days.     dorzolamide-timolol (COSOPT) 22.3-6.8 mg/mL ophthalmic solution Administer 1 drop to both eyes 2 (two) times a day.     finasteride (PROSCAR) 5 mg tablet Take 5 mg by mouth daily.     latanoprost (XALATAN) 0.005 % ophthalmic solution Administer 1 drop to both eyes at bedtime.     tamsulosin (FLOMAX) 0.4 mg Cp24 TAKE ONE CAPSULE BY MOUTH ONCE DAILY         REVIEW OF SYSTEMS:    Poor historian due to dementia, not sure when asked many ROS.       PHYSICAL EXAMINATION:  Vitals:    01/16/20 1650   BP: 130/74   Pulse: 66   Temp: 98.6  F (37  C)   Weight: 196 lb 4.8 oz (89 kg)         GENERAL: Awake, Alert, oriented, not in any form of acute distress, answers questions appropriately, follows simple commands, conversant  HEENT: Head is normocephalic with normal hair distribution. No evidence of trauma. Ears: No acute purulent discharge. Eyes: Conjunctivae pink with no scleral jaundice. Nose: Normal mucosa and septum. NECK: Supple with no cervical or supraclavicular lymphadenopathy. Trachea is midline.   CHEST: No tenderness or deformity, no crepitus  LUNG: Clear to auscultation with good chest expansion. There are no crackles or wheezes, normal AP diameter.  BACK: No kyphosis of the thoracic spine. Symmetric, no curvature, ROM normal, no CVA tenderness, no spinal tenderness   CVS: There is good S1  S2, there are no murmurs, rubs, gallops, or heaves, rhythm is regular.  ABDOMEN: Globular and soft, nontender to palpation, non distended, no masses, no organomegaly, good bowel sounds, no rebound or guarding, no peritoneal signs.   EXTREMITIES: Atraumatic. Full range of motion on both upper and lower extremities, has  pedal edema, no cyanosis or clubbing, no calf tenderness, normal cap refill, no joint swelling.Left arm in compression sleeve, some edema.  SKIN: Warm and dry, no erythema noted, no rashes or lesions.  NEUROLOGICAL: The patient is oriented to person, place. Strength  and sensation are grossly intact. Face is symmetric.                    LABS:    Lab Results   Component Value Date    WBC 7.8 01/13/2020    HGB 14.2 01/13/2020    HCT 43.7 01/13/2020    MCV 92 01/13/2020     01/13/2020       Results for orders placed or performed in visit on 01/13/20   Basic Metabolic Panel   Result Value Ref Range    Sodium 143 136 - 145 mmol/L    Potassium 3.8 3.5 - 5.0 mmol/L    Chloride 110 (H) 98 - 107 mmol/L    CO2 26 22 - 31 mmol/L    Anion Gap, Calculation 7 5 - 18 mmol/L    Glucose 144 (H) 70 - 125 mg/dL    Calcium 8.6 8.5 - 10.5 mg/dL    BUN 11 8 - 28 mg/dL    Creatinine 0.88 0.70 - 1.30 mg/dL    GFR MDRD Af Amer >60 >60 mL/min/1.73m2    GFR MDRD Non Af Amer >60 >60 mL/min/1.73m2           Lab Results   Component Value Date    HGBA1C 7.7 (H) 01/07/2020     Vitamin D, Total (25-Hydroxy)   Date Value Ref Range Status   01/07/2019 9.2 (L) 30.0 - 80.0 ng/mL Final     Lab Results   Component Value Date    [id number] 630 01/07/2019       ASSESSMENT/PLAN:  1. Dementia without behavioral disturbance, unspecified dementia type (H)    2. Cellulitis of left upper extremity      1. Dementia: We will have OT review ongoing cognition testing during TCU stay. Staff to assist with ADLS and mange safety.Wife looking at LTC when dced.    2. Cellulitis: Left  arm with compression sleeve, Continue Keflex 500 two times a day X 10 days until completed. He is in a deconditioned state and to participate in rehab on the TCU with SN for chronic medical condition management. Pain stable. Arms with scabbed area over cellulitis.      Electronically signed by:  Isabela Woods CNP  This progress note was completed using Dragon software and there may be grammatical errors.

## 2021-06-05 NOTE — PROGRESS NOTES
[facility] Admission note      Patient: Delta Barrera  MRN: [id number]  Date of Service: 1/14/2020      [facility] [place] [[id number]]  Reason for Visit     Chief Complaint   Patient presents with     H & P       Code Status     DNR/ DNI    Assessment     -Left upper extremity cellulitis  -History of a mechanical fall  -Dementia with confusion noted and limited recall  -History of atrial fibrillation  - htn  - DM-2  -History of COPD emphysema  -Generalized weakness patient is wheelchair-bound    Plan     Patient admitted to the TCU for strengthening and rehab.  He will finish his oral antibiotics in the TCU.  Urged on Keflex for additional 10 days  Wound care orders rescinded as his wounds have healed in a very shallow and superficial  Due to high falls risk he has been taken off anticoagulation for atrial fibrillation.  He is on aspirin 81 mg daily  He will be participating in strengthening and gait stability training.  .  Monitor blood sugars he is no longer on oral meds.  His last A1c was 7.7.  He has been discharged with no blood sugar checks or insulin sliding scale.  To assess control in light of his elevated A1c we will order blood sugar checks twice daily for the next few days and monitor response  For atrial fibrillation monitor heart rates  He is no longer on any rate control medications.  He has history of BPH and is on Flomax and Proscar.  Monitor him for any voiding concerns  Cognitively he remains impaired and quite confused will await for assessment for him ot in the meantime he remains a fall risk and is being monitored more closely.  He is currently wheelchair-bound.  He has multiple eyedrops for glaucoma but no diagnosis listed on questioning he is putting the drops in his right eye's orders will be placed for that.  Continue with his PT OT and rehab    History     Patient is a very pleasant 85 y.o. male who is admitted to TCU  He presented to the hospital after  mechanical fall.  He was complaining of left-sided shoulder pain.  Imaging however was negative for any fracture or dislocation of his shoulder  He was noted to have increased erythema of his left upper extremity and was diagnosed with cellulitis he was treated with IV cefazolin in the hospital.  He has an underlying history of atrial fibrillation his heart rates were adequately controlled however he is no longer on anticoagulation any longer due to advanced age debilitation and falls risk.  He also has a history of COPD emphysema which was felt to be stable.  Patient has a history of diabetes his last A1c was 7.7 he is no longer on any medications and was covered with sliding scale insulin   NO blood sugars checks were ordered up in the TCU.  He has underlying history of significant dementia and alert and oriented only to self  Remains pleasantly confused with cooperative with care denies any concerns.  He however has limited recall and not sure why he is admitted to the TCU and where he is currently    Past Medical History     Active Ambulatory (Non-Hospital) Problems    Diagnosis     ACP (advance care planning)     Cellulitis     Cellulitis of left upper extremity     Dementia without behavioral disturbance (H)     Benign prostatic hyperplasia with urinary retention     Type 2 diabetes mellitus with hyperglycemia, without long-term current use of insulin (H)     Atrial tachycardia, multifocal (H)     Hypertension     Coronary artery disease due to lipid rich plaque     Hypercholesteremia     Aneurysm Of The Abdominal Aorta     COPD (chronic obstructive pulmonary disease) (H)     Past Medical History:   Diagnosis Date     COPD (chronic obstructive pulmonary disease) (H)      Dementia (H)      Diverticulosis      Goals of care, counseling/discussion      Hypercholesteremia      Hypertension      Influenza A H1N1 infection 12/28/2018       Past Social History     Reviewed, and he  reports that he quit smoking about  16 years ago. He has a 90.00 pack-year smoking history. He has never used smokeless tobacco. He reports previous alcohol use. He reports previous drug use.    Family History     Reviewed, and includes a history of emphysema and COPD in both his parents who were smokers.  Several of his siblings also have COPD and have been smokers    Medication List   Post Discharge Medication Reconciliation Status: discharge medications reconciled and changed, per note/orders (see AVS)   Current Outpatient Medications on File Prior to Visit   Medication Sig Dispense Refill     aspirin 81 MG EC tablet Take 81 mg by mouth daily.       atorvastatin (LIPITOR) 40 MG tablet TAKE 1 TABLET BY MOUTH ONCE DAILY 90 tablet 3     betamethasone dipropionate (DIPROLENE) 0.05 % cream Apply topically 2 (two) times a day for 7 days. Apply to the red area of left forearm for a week 30 g 0     brimonidine (ALPHAGAN) 0.15 % ophthalmic solution Apply 1 drop to eye 2 (two) times a day.  11     cephalexin (KEFLEX) 500 MG capsule Take 1 capsule (500 mg total) by mouth 4 (four) times a day for 10 days. 40 capsule 0     dorzolamide-timolol (COSOPT) 22.3-6.8 mg/mL ophthalmic solution Administer 1 drop to both eyes 2 (two) times a day.       finasteride (PROSCAR) 5 mg tablet Take 5 mg by mouth daily.       latanoprost (XALATAN) 0.005 % ophthalmic solution Administer 1 drop to both eyes at bedtime.       tamsulosin (FLOMAX) 0.4 mg Cp24 TAKE ONE CAPSULE BY MOUTH ONCE DAILY 90 capsule 3     No current facility-administered medications on file prior to visit.        Allergies     No Known Allergies    Review of Systems   A comprehensive review of 14 systems was done. Pertinent findings noted here and in history of present illness. All the rest negative.  Constitutional: Negative.  Negative for fever, chills, he has activity change, appetite change and fatigue.   HENT: Negative for congestion and facial swelling.    Eyes: Negative for photophobia, redness and  visual disturbance.   Respiratory: Negative for cough and chest tightness.    Cardiovascular: Negative for chest pain, palpitations and leg swelling.   Gastrointestinal: Negative for nausea, diarrhea, constipation, blood in stool and abdominal distention.   Genitourinary: Negative.    Musculoskeletal: Negative.  Not sure why he cannot walk as per him  Denies any pain  Skin: Negative.  Right arm rash is improving  Neurological: Negative for dizziness, tremors, syncope, weakness, light-headedness and headaches.   Hematological: Does not bruise/bleed easily.   Psychiatric/Behavioral: Negative.  Confused with a limited recall feels he was dropped here by his wife for walking      Physical Exam     Recent Vitals 1/11/2020   Height -   Weight 200 lbs 10 oz   BSA (m2) 2.07 m2   /80   Pulse 76   Temp 97.2   Temp src -   SpO2 -   Some recent data might be hidden       Constitutional: Oriented to person,  and appears well-developed.  Patient is obese  HEENT:  Normocephalic and atraumatic.  Eyes: Conjunctivae and EOM are normal. Pupils are equal, round, and reactive to light. No discharge.  No scleral icterus. Nose normal. Mouth/Throat: Oropharynx is clear and moist. No oropharyngeal exudate.    NECK: Normal range of motion. Neck supple. No JVD present. No tracheal deviation present. No thyromegaly present.   CARDIOVASCULAR: Normal rate, regular rhythm and intact distal pulses.  Exam reveals no gallop and no friction rub.  Systolic murmur present.  PULMONARY: Effort normal and breath sounds normal. No respiratory distress.No Wheezing or rales.  ABDOMEN: Soft. Bowel sounds are normal. No distension and no mass.  There is no tenderness. There is no rebound and no guarding. No HSM.  MUSCULOSKELETAL: Normal range of motion. No edema and no tenderness. Mild kyphosis, no tenderness.  LYMPH NODES: Has no cervical, supraclavicular, axillary and groin adenopathy.   NEUROLOGICAL: Alert and oriented to person, . No cranial nerve  deficit.  Normal muscle tone. Coordination normal.   GENITOURINARY: Deferred exam.  SKIN: Skin is warm and dry. rash noted in left forearm which seems to be improving there are small areas of abrasions noted but no active drainage minimal erythema which is appears to be fading also;. No erythema. No pallor.   EXTREMITIES: No cyanosis, no clubbing, no edema. No Deformity.  PSYCHIATRIC: Normal mood, affect and behavior.  Recall is impaired judgment is impaired      Lab Results     Recent Results (from the past 240 hour(s))   ECG 12 lead nursing unit performed    Collection Time: 01/06/20  4:21 AM   Result Value Ref Range    SYSTOLIC BLOOD PRESSURE 169 mmHg    DIASTOLIC BLOOD PRESSURE 91 mmHg    VENTRICULAR RATE 94 BPM    ATRIAL RATE 100 BPM    P-R INTERVAL      QRS DURATION 128 ms    Q-T INTERVAL 376 ms    QTC CALCULATION (BEZET) 470 ms    P Axis      R AXIS 78 degrees    T AXIS -30 degrees    MUSE DIAGNOSIS       Atrial fibrillation  Right bundle branch block  T wave abnormality, consider inferior ischemia  Abnormal ECG  When compared with ECG of 28-DEC-2018 09:06,  No significant change was found  Confirmed by SEE ED PROVIDER NOTE FOR, ECG INTERPRETATION ([id number]),  INGRID LOPEZ ([id number]) on 1/6/2020 10:52:46 AM     Comprehensive metabolic panel    Collection Time: 01/06/20  4:36 AM   Result Value Ref Range    Sodium 141 136 - 145 mmol/L    Potassium 4.2 3.5 - 5.0 mmol/L    Chloride 107 98 - 107 mmol/L    CO2 23 22 - 31 mmol/L    Anion Gap, Calculation 11 5 - 18 mmol/L    Glucose 170 (H) 70 - 125 mg/dL    BUN 16 8 - 28 mg/dL    Creatinine 1.13 0.70 - 1.30 mg/dL    GFR MDRD Af Amer >60 >60 mL/min/1.73m2    GFR MDRD Non Af Amer >60 >60 mL/min/1.73m2    Bilirubin, Total 1.0 0.0 - 1.0 mg/dL    Calcium 9.1 8.5 - 10.5 mg/dL    Protein, Total 6.7 6.0 - 8.0 g/dL    Albumin 3.6 3.5 - 5.0 g/dL    Alkaline Phosphatase 94 45 - 120 U/L    AST 12 0 - 40 U/L    ALT 10 0 - 45 U/L   Magnesium    Collection Time: 01/06/20  4:36 AM    Result Value Ref Range    Magnesium 2.0 1.8 - 2.6 mg/dL   Protime-INR    Collection Time: 01/06/20  4:36 AM   Result Value Ref Range    INR 0.98 0.90 - 1.10   Lactic Acid    Collection Time: 01/06/20  4:36 AM   Result Value Ref Range    Lactic Acid 1.7 0.5 - 2.2 mmol/L   C-Reactive Protein    Collection Time: 01/06/20  4:36 AM   Result Value Ref Range    CRP 1.5 (H) 0.0 - 0.8 mg/dL   Blood culture from PERIPHERAL SITE    Collection Time: 01/06/20  4:36 AM   Result Value Ref Range    Anaerobic Blood Culture Bottle No Growth No Growth, No organisms seen, bottle returned to instrument, Specimen not received, No Growth at 24 hours, No Growth at 48 hours, No Growth at 72 hours, No Growth at 96 hours, No Growth at 120 hours    Aerobic Blood Culture Bottle No Growth No Growth, No organisms seen, bottle returned to instrument, Specimen not received, No Growth at 24 hours, No Growth at 120 hours, No Growth at 48 hours, No Growth at 72 hours, No Growth at 96 hours   Troponin I    Collection Time: 01/06/20  4:36 AM   Result Value Ref Range    Troponin I 0.01 0.00 - 0.29 ng/mL   HM1 (CBC with Diff)    Collection Time: 01/06/20  4:36 AM   Result Value Ref Range    WBC 9.2 4.0 - 11.0 thou/uL    RBC 5.25 4.40 - 6.20 mill/uL    Hemoglobin 15.9 14.0 - 18.0 g/dL    Hematocrit 48.1 40.0 - 54.0 %    MCV 92 80 - 100 fL    MCH 30.3 27.0 - 34.0 pg    MCHC 33.1 32.0 - 36.0 g/dL    RDW 14.3 11.0 - 14.5 %    Platelets 216 140 - 440 thou/uL    MPV 9.8 8.5 - 12.5 fL    Neutrophils % 70 50 - 70 %    Lymphocytes % 17 (L) 20 - 40 %    Monocytes % 9 2 - 10 %    Eosinophils % 3 0 - 6 %    Basophils % 0 0 - 2 %    Neutrophils Absolute 6.4 2.0 - 7.7 thou/uL    Lymphocytes Absolute 1.6 0.8 - 4.4 thou/uL    Monocytes Absolute 0.9 0.0 - 0.9 thou/uL    Eosinophils Absolute 0.3 0.0 - 0.4 thou/uL    Basophils Absolute 0.0 0.0 - 0.2 thou/uL   CK Total    Collection Time: 01/06/20  4:36 AM   Result Value Ref Range    CK, Total 48 30 - 190 U/L    Glycosylated Hemoglobin A1C    Collection Time: 01/06/20  4:36 AM   Result Value Ref Range    Hemoglobin A1c 7.8 (H) 4.2 - 6.1 %   POCT Glucose    Collection Time: 01/06/20  6:32 AM   Result Value Ref Range    Glucose 157 (H) 70 - 139 mg/dL   POCT Glucose    Collection Time: 01/06/20 11:40 AM   Result Value Ref Range    Glucose 189 (H) 70 - 139 mg/dL   POCT Glucose    Collection Time: 01/06/20  5:25 PM   Result Value Ref Range    Glucose 148 (H) 70 - 139 mg/dL   POCT Glucose    Collection Time: 01/06/20  9:18 PM   Result Value Ref Range    Glucose 158 (H) 70 - 139 mg/dL   Urinalysis-UC if Indicated    Collection Time: 01/06/20  9:20 PM   Result Value Ref Range    Color, UA Yellow Colorless, Yellow, Straw, Light Yellow    Clarity, UA Clear Clear    Glucose, UA 50 mg/dL (!) Negative    Bilirubin, UA Negative Negative    Ketones, UA Negative Negative    Specific Gravity, UA 1.025 1.001 - 1.030    Blood, UA Negative Negative    pH, UA 5.0 4.5 - 8.0    Protein, UA Negative Negative mg/dL    Urobilinogen, UA <2.0 E.U./dL <2.0 E.U./dL, 2.0 E.U./dL    Nitrite, UA Negative Negative    Leukocytes, UA Moderate (!) Negative    Bacteria, UA None Seen None Seen hpf    RBC, UA 0-2 None Seen, 0-2 hpf    WBC, UA 10-25 (!) None Seen, 0-5 hpf    Squam Epithel, UA 0-5 None Seen, 0-5 lpf    Mucus, UA Moderate (!) None Seen lpf    Ca Oxalate Jaqueline, UA Present (!) None Seen   Culture, Urine    Collection Time: 01/06/20  9:20 PM   Result Value Ref Range    Culture No Growth    Basic metabolic panel    Collection Time: 01/07/20  6:04 AM   Result Value Ref Range    Sodium 142 136 - 145 mmol/L    Potassium 4.2 3.5 - 5.0 mmol/L    Chloride 111 (H) 98 - 107 mmol/L    CO2 22 22 - 31 mmol/L    Anion Gap, Calculation 9 5 - 18 mmol/L    Glucose 155 (H) 70 - 125 mg/dL    Calcium 8.7 8.5 - 10.5 mg/dL    BUN 16 8 - 28 mg/dL    Creatinine 1.09 0.70 - 1.30 mg/dL    GFR MDRD Af Amer >60 >60 mL/min/1.73m2    GFR MDRD Non Af Amer >60 >60 mL/min/1.73m2    Glycosylated Hemoglobin A1C    Collection Time: 01/07/20  6:04 AM   Result Value Ref Range    Hemoglobin A1c 7.7 (H) 4.2 - 6.1 %   HM1 (CBC with Diff)    Collection Time: 01/07/20  6:05 AM   Result Value Ref Range    WBC 7.1 4.0 - 11.0 thou/uL    RBC 4.99 4.40 - 6.20 mill/uL    Hemoglobin 15.2 14.0 - 18.0 g/dL    Hematocrit 46.4 40.0 - 54.0 %    MCV 93 80 - 100 fL    MCH 30.5 27.0 - 34.0 pg    MCHC 32.8 32.0 - 36.0 g/dL    RDW 14.3 11.0 - 14.5 %    Platelets 184 140 - 440 thou/uL    MPV 9.7 8.5 - 12.5 fL    Neutrophils % 61 50 - 70 %    Lymphocytes % 23 20 - 40 %    Monocytes % 11 (H) 2 - 10 %    Eosinophils % 5 0 - 6 %    Basophils % 0 0 - 2 %    Neutrophils Absolute 4.3 2.0 - 7.7 thou/uL    Lymphocytes Absolute 1.6 0.8 - 4.4 thou/uL    Monocytes Absolute 0.8 0.0 - 0.9 thou/uL    Eosinophils Absolute 0.4 0.0 - 0.4 thou/uL    Basophils Absolute 0.0 0.0 - 0.2 thou/uL   POCT Glucose    Collection Time: 01/07/20  6:22 AM   Result Value Ref Range    Glucose 160 (H) 70 - 139 mg/dL   POCT Glucose    Collection Time: 01/07/20 12:47 PM   Result Value Ref Range    Glucose 128 70 - 139 mg/dL   POCT Glucose    Collection Time: 01/07/20  5:31 PM   Result Value Ref Range    Glucose 154 (H) 70 - 139 mg/dL   POCT Glucose    Collection Time: 01/07/20  9:34 PM   Result Value Ref Range    Glucose 176 (H) 70 - 139 mg/dL   POCT Glucose    Collection Time: 01/08/20  6:28 AM   Result Value Ref Range    Glucose 132 70 - 139 mg/dL   POCT Glucose    Collection Time: 01/08/20 11:58 AM   Result Value Ref Range    Glucose 171 (H) 70 - 139 mg/dL   POCT Glucose    Collection Time: 01/08/20  4:29 PM   Result Value Ref Range    Glucose 120 70 - 139 mg/dL   POCT Glucose    Collection Time: 01/08/20  8:32 PM   Result Value Ref Range    Glucose 158 (H) 70 - 139 mg/dL   POCT Glucose    Collection Time: 01/09/20  6:16 AM   Result Value Ref Range    Glucose 126 70 - 139 mg/dL   POCT Glucose    Collection Time: 01/09/20 11:10 AM   Result Value Ref  Range    Glucose 163 (H) 70 - 139 mg/dL   Basic Metabolic Panel    Collection Time: 01/13/20  6:00 AM   Result Value Ref Range    Sodium 143 136 - 145 mmol/L    Potassium 3.8 3.5 - 5.0 mmol/L    Chloride 110 (H) 98 - 107 mmol/L    CO2 26 22 - 31 mmol/L    Anion Gap, Calculation 7 5 - 18 mmol/L    Glucose 144 (H) 70 - 125 mg/dL    Calcium 8.6 8.5 - 10.5 mg/dL    BUN 11 8 - 28 mg/dL    Creatinine 0.88 0.70 - 1.30 mg/dL    GFR MDRD Af Amer >60 >60 mL/min/1.73m2    GFR MDRD Non Af Amer >60 >60 mL/min/1.73m2   HM2(CBC w/o Differential)    Collection Time: 01/13/20  6:00 AM   Result Value Ref Range    WBC 7.8 4.0 - 11.0 thou/uL    RBC 4.74 4.40 - 6.20 mill/uL    Hemoglobin 14.2 14.0 - 18.0 g/dL    Hematocrit 43.7 40.0 - 54.0 %    MCV 92 80 - 100 fL    MCH 30.0 27.0 - 34.0 pg    MCHC 32.5 32.0 - 36.0 g/dL    RDW 14.1 11.0 - 14.5 %    Platelets 197 140 - 440 thou/uL    MPV 10.3 8.5 - 12.5 fL   Magnesium    Collection Time: 01/13/20  6:00 AM   Result Value Ref Range    Magnesium 1.9 1.8 - 2.6 mg/dL            Imaging Results     Xr Humerus Left 2 Vws    Result Date: 1/6/2020  EXAM: XR FOREARM LEFT, XR HUMERUS LEFT 2 VWS LOCATION: [facility] DATE/TIME: 1/6/2020 5:13 AM INDICATION: Trauma; infection. COMPARISON: None.     No fracture or dislocation. Arthritis in the shoulder joint. Multiple vascular calcifications. No soft tissue gas.     Xr Forearm Left    Result Date: 1/6/2020  EXAM: XR FOREARM LEFT, XR HUMERUS LEFT 2 VWS LOCATION: [facility] DATE/TIME: 1/6/2020 5:13 AM INDICATION: Trauma; infection. COMPARISON: None.     No fracture or dislocation. Arthritis in the shoulder joint. Multiple vascular calcifications. No soft tissue gas.             CALLI Wynn

## 2021-06-05 NOTE — TELEPHONE ENCOUNTER
Reached out to Diana with [facility] and provided her with a verbal okay for the requested orders via voicemail. Selene Sullivan

## 2021-06-05 NOTE — PROGRESS NOTES
"  [facility]      NAME:  Detla Barrera             :  1934    MRN: [id number]    CODE STATUS:  FULL CODE    FACILITY: [facility] [[id number]]         CHIEF COMPLAIN/REASON FOR VISIT:  Chief Complaint   Patient presents with     Review Of Multiple Medical Conditions       HISTORY OF PRESENT ILLNESS: Delta Barrera is a 85 y.o. male being seen today for review of multiple medical conditions. He comes from the [facility] after a stay from  to  where he presented due to a cellulitis to left arm.  Per EMR \"85 y old male with history of dementia, atrial fibrillation, emphysema, type 2 diabetes, hypertension, coronary artery disease, hyperlipidemia and BPH presented with progressive erythema of left upper extremity, started after the injury to his left elbow following a mechanical fall few days before arrival.  He was diagnosed with cellulitis, started on cefazolin. Responded well, he was eventually discharged with Keflex. Betamethasone topical added to her regimen for dermatitis and itchy skin of his forearm \". Although he has dementia he is awake and alert, able to answer most questions. Asked where he lived before hospitalization, thought he lived in [facility] but wasn't sure. He does know he is at [facility]. RN on duty and I did remove his edema sleeve to left arm, to inspect site. Has several scabbed over non draining skin abrasion to elbow area. His arm has warm erythema and some swelling present.  We reviewed med list, TCU routines and advance care planning this am.       No Known Allergies:     Current Outpatient Medications   Medication Sig     aspirin 81 MG EC tablet Take 81 mg by mouth daily.     atorvastatin (LIPITOR) 40 MG tablet TAKE 1 TABLET BY MOUTH ONCE DAILY     betamethasone dipropionate (DIPROLENE) 0.05 % cream Apply topically 2 (two) times a day for 7 days. Apply to the red area of left forearm for a week     brimonidine (ALPHAGAN) 0.15 % " ophthalmic solution Apply 1 drop to eye 2 (two) times a day.     cephalexin (KEFLEX) 500 MG capsule Take 1 capsule (500 mg total) by mouth 4 (four) times a day for 10 days.     dorzolamide-timolol (COSOPT) 22.3-6.8 mg/mL ophthalmic solution Administer 1 drop to both eyes 2 (two) times a day.     finasteride (PROSCAR) 5 mg tablet Take 5 mg by mouth daily.     latanoprost (XALATAN) 0.005 % ophthalmic solution Administer 1 drop to both eyes at bedtime.     tamsulosin (FLOMAX) 0.4 mg Cp24 TAKE ONE CAPSULE BY MOUTH ONCE DAILY         REVIEW OF SYSTEMS:    Poor historian due to dementia, not sure when asked many ROS.       PHYSICAL EXAMINATION:  Vitals:    01/11/20 0653   BP: 133/80   Pulse: 76   Temp: 97.2  F (36.2  C)   Weight: 200 lb 9.6 oz (91 kg)         GENERAL: Awake, Alert, oriented, not in any form of acute distress, answers questions appropriately, follows simple commands, conversant  HEENT: Head is normocephalic with normal hair distribution. No evidence of trauma. Ears: No acute purulent discharge. Eyes: Conjunctivae pink with no scleral jaundice. Nose: Normal mucosa and septum. NECK: Supple with no cervical or supraclavicular lymphadenopathy. Trachea is midline.   CHEST: No tenderness or deformity, no crepitus  LUNG: Clear to auscultation with good chest expansion. There are no crackles or wheezes, normal AP diameter.  BACK: No kyphosis of the thoracic spine. Symmetric, no curvature, ROM normal, no CVA tenderness, no spinal tenderness   CVS: There is good S1  S2, there are no murmurs, rubs, gallops, or heaves, rhythm is regular.  ABDOMEN: Globular and soft, nontender to palpation, non distended, no masses, no organomegaly, good bowel sounds, no rebound or guarding, no peritoneal signs.   EXTREMITIES: Atraumatic. Full range of motion on both upper and lower extremities, has  pedal edema, no cyanosis or clubbing, no calf tenderness, normal cap refill, no joint swelling.Left arm in compression sleeve, some  edema.  SKIN: Warm and dry, no erythema noted, no rashes or lesions.  NEUROLOGICAL: The patient is oriented to person, place. Strength and sensation are grossly intact. Face is symmetric.                    LABS:    Lab Results   Component Value Date    WBC 7.1 01/07/2020    HGB 15.2 01/07/2020    HCT 46.4 01/07/2020    MCV 93 01/07/2020     01/07/2020       Results for orders placed or performed during the hospital encounter of 01/06/20   Basic metabolic panel   Result Value Ref Range    Sodium 142 136 - 145 mmol/L    Potassium 4.2 3.5 - 5.0 mmol/L    Chloride 111 (H) 98 - 107 mmol/L    CO2 22 22 - 31 mmol/L    Anion Gap, Calculation 9 5 - 18 mmol/L    Glucose 155 (H) 70 - 125 mg/dL    Calcium 8.7 8.5 - 10.5 mg/dL    BUN 16 8 - 28 mg/dL    Creatinine 1.09 0.70 - 1.30 mg/dL    GFR MDRD Af Amer >60 >60 mL/min/1.73m2    GFR MDRD Non Af Amer >60 >60 mL/min/1.73m2           Lab Results   Component Value Date    HGBA1C 7.7 (H) 01/07/2020     Vitamin D, Total (25-Hydroxy)   Date Value Ref Range Status   01/07/2019 9.2 (L) 30.0 - 80.0 ng/mL Final     Lab Results   Component Value Date    [id number] 630 01/07/2019       ASSESSMENT/PLAN:  1. Cellulitis of left upper extremity    2. Dementia without behavioral disturbance, unspecified dementia type (H)    3. ACP (advance care planning)      1. Cellulitis: Left  arm with compression sleeve, Continue Keflex 500 two times a day X 10 days until completed. He is in a deconditioned state and to participate in rehab on the TCU with SN for chronic medical condition management.     2. Dementia: We will have OT review ongoing cognition testing during TCU stay. Staff to assist with ADLS and mange safety.    3. ACP: Greater then 16 minutes spent face to face reviewing advanced care planning. And signing POLST as Full code..      Electronically signed by:  Isabela Woods CNP  This progress note was completed using Dragon software and there may be grammatical errors.

## 2021-06-05 NOTE — TELEPHONE ENCOUNTER
Orders being requested: Home Care  Skilled Nursing     2 times a week for 2 weeks  Physical Therapy    Evaluation  Occupational Therapy    Evaluation    Reason service is needed/diagnosis: Disease management.  When are orders needed by: ASAP  Where to send Orders: Phone:  verbal orders   Okay to leave detailed message?  Yes

## 2021-06-05 NOTE — PROGRESS NOTES
"  [facility]      NAME:  Delta Barrera             :  1934    MRN: [id number]    CODE STATUS:  FULL CODE    FACILITY: [facility] [[id number]]         CHIEF COMPLAIN/REASON FOR VISIT:  Chief Complaint   Patient presents with     Review Of Multiple Medical Conditions       HISTORY OF PRESENT ILLNESS: Delta Barrera is a 85 y.o. male being seen today for review of multiple medical conditions. He comes from the [facility] after a stay from  to  where he presented due to a cellulitis to left arm.  Per EMR \"85 y old male with history of dementia, atrial fibrillation, emphysema, type 2 diabetes, hypertension, coronary artery disease, hyperlipidemia and BPH presented with progressive erythema of left upper extremity, started after the injury to his left elbow following a mechanical fall few days before arrival.  He was diagnosed with cellulitis, started on cefazolin. Responded well, he was eventually discharged with Keflex. Betamethasone topical added to her regimen for dermatitis and itchy skin of his forearm \". Although he has dementia he is awake and alert, able to answer most questions. Asked where he lived before hospitalization, thought he lived in [facility] but wasn't sure. He does know he is at [facility].. Nurses with concerns BP has been trending up.  No Known Allergies:     Current Outpatient Medications   Medication Sig     metoprolol succinate (TOPROL-XL) 25 MG Take 12.5 mg by mouth daily.     aspirin 81 MG EC tablet Take 81 mg by mouth daily.     atorvastatin (LIPITOR) 40 MG tablet TAKE 1 TABLET BY MOUTH ONCE DAILY     brimonidine (ALPHAGAN) 0.15 % ophthalmic solution Apply 1 drop to eye 2 (two) times a day.     dorzolamide-timolol (COSOPT) 22.3-6.8 mg/mL ophthalmic solution Administer 1 drop to both eyes 2 (two) times a day.     finasteride (PROSCAR) 5 mg tablet Take 5 mg by mouth daily.     latanoprost (XALATAN) 0.005 % ophthalmic solution " Administer 1 drop to both eyes at bedtime.     tamsulosin (FLOMAX) 0.4 mg Cp24 TAKE ONE CAPSULE BY MOUTH ONCE DAILY         REVIEW OF SYSTEMS:    Poor historian due to dementia, not sure when asked many ROS.       PHYSICAL EXAMINATION:  Vitals:    01/20/20 1402   BP: (!) 144/96   Pulse: 73   Temp: (!) 96.1  F (35.6  C)   Weight: 196 lb 6.4 oz (89.1 kg)         GENERAL: Awake, Alert, oriented, not in any form of acute distress, answers questions appropriately, follows simple commands, conversant  HEENT: Head is normocephalic with normal hair distribution. No evidence of trauma. Ears: No acute purulent discharge. Eyes: Conjunctivae pink with no scleral jaundice. Nose: Normal mucosa and septum. NECK: Supple with no cervical or supraclavicular lymphadenopathy. Trachea is midline.   CHEST: No tenderness or deformity, no crepitus  LUNG: Clear to auscultation with good chest expansion. There are no crackles or wheezes, normal AP diameter.  BACK: No kyphosis of the thoracic spine. Symmetric, no curvature, ROM normal, no CVA tenderness, no spinal tenderness   CVS: There is good S1  S2, there are no murmurs, rubs, gallops, or heaves, rhythm is regular.  ABDOMEN: Globular and soft, nontender to palpation, non distended, no masses, no organomegaly, good bowel sounds, no rebound or guarding, no peritoneal signs.   EXTREMITIES: Atraumatic. Full range of motion on both upper and lower extremities, has  pedal edema, no cyanosis or clubbing, no calf tenderness, normal cap refill, no joint swelling.Left arm in compression sleeve, some edema.  SKIN: Warm and dry, no erythema noted, no rashes or lesions.  NEUROLOGICAL: The patient is oriented to person, place. Strength and sensation are grossly intact. Face is symmetric.      LABS:    Lab Results   Component Value Date    WBC 7.8 01/13/2020    HGB 14.2 01/13/2020    HCT 43.7 01/13/2020    MCV 92 01/13/2020     01/13/2020       Results for orders placed or performed in visit on  01/13/20   Basic Metabolic Panel   Result Value Ref Range    Sodium 143 136 - 145 mmol/L    Potassium 3.8 3.5 - 5.0 mmol/L    Chloride 110 (H) 98 - 107 mmol/L    CO2 26 22 - 31 mmol/L    Anion Gap, Calculation 7 5 - 18 mmol/L    Glucose 144 (H) 70 - 125 mg/dL    Calcium 8.6 8.5 - 10.5 mg/dL    BUN 11 8 - 28 mg/dL    Creatinine 0.88 0.70 - 1.30 mg/dL    GFR MDRD Af Amer >60 >60 mL/min/1.73m2    GFR MDRD Non Af Amer >60 >60 mL/min/1.73m2           Lab Results   Component Value Date    HGBA1C 7.7 (H) 01/07/2020     Vitamin D, Total (25-Hydroxy)   Date Value Ref Range Status   01/07/2019 9.2 (L) 30.0 - 80.0 ng/mL Final     Lab Results   Component Value Date    [id number] 630 01/07/2019       ASSESSMENT/PLAN:  1. Dementia without behavioral disturbance, unspecified dementia type (H)    2. Cellulitis of left upper extremity    3. Hypertension      1. Dementia: We will have OT review ongoing cognition testing during TCU stay. Staff to assist with ADLS and mange safety.Wife looking at LTC when dced.    2. Cellulitis: Left  arm with compression sleeve, Continue Keflex 500 two times a day X 10 days until completed. He is in a deconditioned state and to participate in rehab on the TCU with SN for chronic medical condition management. Pain stable. Arms with scabbed area over cellulitis.    3. HTN: BP has been trending high, this am at 144/96, over week end noted to have dystalic at 105. Start 12.5 mg po daily and we will trend. BMP in am.      Electronically signed by:  Isabela Woods CNP  This progress note was completed using Dragon software and there may be grammatical errors.

## 2021-06-05 NOTE — TELEPHONE ENCOUNTER
Orders being requested: Delay in start of services due to staff availability.  New start date 1/27/20.  Reason service is needed/diagnosis: Balance, mobility, ADL's, shortness of breath, disease management  When are orders needed by:  End of this day  Where to send Orders: Phone:  Verbal order is ok.  Okay to leave detailed message?  Yes

## 2021-06-05 NOTE — TELEPHONE ENCOUNTER
[facility] Nurse Triage Telephone Note      Provider: DINORA Siani  Facility: [facility]    Facility Type: TCU    Caller: Lanny  Call Back Number:  [phone]    Allergies: Patient has no known allergies.    Reason for call: BMP, Heme, Mag results all good.     Verbal Order/Direction given by Provider: AJ    Provider giving order: DINORA Saini    Verbal order given to: Lanny Crzu RN

## 2021-06-06 NOTE — TELEPHONE ENCOUNTER
Patient Returning Call  Reason for call:  The patient's wife, Gaviota returning Selene's call.  Information relayed to patient:  See message below.   Patient has additional questions:  Yes  If YES, what are your questions/concerns: The patient's wife states the patient is in [facility] which they have their own Physicians. Gaviota, patients wife is asking if it is okay for the patient to see one of the Dr's at the facility?  Okay to leave a detailed message?: Yes   Please call Gaviota at [phone] and let her know.

## 2021-06-06 NOTE — TELEPHONE ENCOUNTER
Absolutely, Mr. Barrera will do well with the physicians there if that is the family's preference as would make most logistic sense. I see Dr. Calderon (geriatrician) is now listed as his PCP, which is no problem. Any further cares/orders/prescriptions can come from Dr. Calderon's group.  Thanks,  Estefanía Plaza MD

## 2021-06-06 NOTE — TELEPHONE ENCOUNTER
Reached out to patient and left a message to return phone call. Please assist patient with scheduling an appointment per the request of Dr. Plaza. Visit type should be in patient follow up. Thank you,   Selene Sullivan

## 2021-06-08 NOTE — PROGRESS NOTES
MTM Encounter    ASSESSMENT AND PLAN    1. Type 2 diabetes mellitus   A1c is meeting goal of <8% for his elderly patient. He is on low dose metformin and a priority for him is to minimize medication use as much as possible. I think it would be reasonable to stop metformin as he is on a low dose and A1c will likely still remain below 8%. Strict diabetes control is not essential in elderly patients.   Plan   1. Stop metformin.     2. Essential hypertension   Blood pressure is well controlled and meeting goal of <140/90 mm Hg without use of anti-hypertensives.     3. Hypercholesteremia  Appropriately on a high intensity statin given age, ASCVD history, and diabetes diagnosis per ACC/AHA guidelines. I agree with using a low dose, 40mg instead of 80mg, based on his past LDL of 31 mg/dl. They are splitting the tablets currently which is bothersome, so I will refill this for the 40 mg strength tablets.   Plan   1. Change to atorvastatin 40 mg tablet strength so no longer have to split tablets.     4. Chronic Obstructive Pulmonary Disease  Not optimally controlled. He is not using Spiriva appropriately, so we reviewed appropriate administration. I recommended taking 2 puffs of Spiriva daily to make sure entire contents of capsule are inhaled and not reusing capsule the next day. Based on his daily symptoms and shortness of breath, I think addition of an ICS/LABA inhaler would be beneficial, but his wife refused this stating it did not work in the past. He was previously on Symbicort, but using a lower dose usually intended for patients with asthma. I would recommend trying Symbicort 160-4.5 mcg 2 puffs BID to see if that improves symptoms, but again, patient and wife declined. I did more strongly recommend addition of a rescue inhaler, albuterol, to use as needed for any shortness of breath or wheezing. He was agreeable to this. Medication education and counseling was provided, including indication, expected effect, dosing  instructions, and possible side effects. The patient's questions were answered.   Plan   1. Inhaler education: Spiriva  2. Use albuterol 2 puffs every 4 hours as needed.     5. Benign prostatic hyperplasia  Stable with no urination concerns. Followed by urology.           Return in about 3 months (around 5/14/2017).      SUBJECTIVE AND OBJECTIVE  Delta Barrera is a 82 y.o. male here for a medication therapy management (MTM) appointment.     1. Type 2 diabetes mellitus   Delta is taking metformin 500 mg daily. He does not check his blood sugars at home. One of his goals is to get off as many medications as possible.   Lab Results   Component Value Date    HGBA1C 7.0 (H) 02/14/2017       2. Essential hypertension   Delta is not taking any medication for high blood pressure. He does have a history of CAD with coronary artery stenosis. He had a bare metal stent placed in 2012. He takes a low dose aspirin daily. He was previously on metoprolol, but this was stopped, possibly due to low blood pressure or issues with breathing/COPD.   BP Readings from Last 3 Encounters:   02/14/17 122/76   10/27/16 140/90   03/21/16 122/82     Pulse Readings from Last 3 Encounters:   02/14/17 82   10/27/16 72   03/21/16 64       3. Hypercholesteremia  Delta is taking atorvastatin 40 mg daily. He has a history of CAD with stent placement. He sees cardiology. His dose of atorvastatin was lowered from 80mg to 40mg so his wife cuts the tablets in half, which is a nuisance.     4. Chronic Obstructive Pulmonary Disease  Delta is taking Spiriva 18 mcg inhaler once daily. He still thinks he can feel some powder left in the capsule the next day so he will sometimes use the same capsule for a couple days. He has never had a rescue inhaler. He has been on Symbicort in the past, but didn't think it did anything so his PCP at the time stopped it. He doesn't feel like his breathing is well controlled as he can't walk very far without  having shortness of breath. His wife says this is a chronic condition, not acute, so doesn't think any other inhalers will help.     5. Benign prostatic hyperplasia  Delta is taking finasteride 5 mg daily and tamsulosin 0.4 mg daily. He does see a urologist. He has a history of bladder stones. He denies any issues with urination. He is following up with the urologist in the next couple months.           Delta's medication list was reviewed with them, discussing reason for use, directions for use, and potential side effects of each medication as needed. Indication, safety, efficacy, and convenience was assessed for all medications addressed above.  No environmental factors were noted currently affecting patient.  This care plan was communicated via EMR with his primary care provider, Cortney Hernandez NP, who is the authorizing prescriber for this visit.  Direct supervision was available by either the patient's PCP or other available provider.    Time Spent: 45 minutes  Time and complexity billing metrics are included in the doc-flowsheet linked to this visit.       María Ngay, PharmD, BCACP    Current Outpatient Prescriptions   Medication Sig Dispense Refill     albuterol (PROVENTIL HFA;VENTOLIN HFA) 90 mcg/actuation inhaler Inhale 2 puffs every 4 (four) hours as needed for wheezing or shortness of breath. 1 Inhaler 11     aspirin 81 MG EC tablet Take 81 mg by mouth daily.       atorvastatin (LIPITOR) 40 MG tablet Take 1 tablet (40 mg total) by mouth daily. 90 tablet 3     finasteride (PROSCAR) 5 mg tablet TAKE ONE TABLET BY MOUTH ONCE DAILY 90 tablet 0     tamsulosin (FLOMAX) 0.4 mg Cp24 TAKE ONE CAPSULE BY MOUTH ONCE DAILY 90 capsule 2     tiotropium (SPIRIVA) 18 mcg inhalation capsule Place 1 capsule (2 puffs total) into inhaler and inhale daily. 90 capsule 0     No current facility-administered medications for this visit.

## 2021-06-08 NOTE — PROGRESS NOTES
Subjective:      Delta Barrera is a 82 y.o. male who presents today to establish care and discuss medication management.    82-year-old gentleman with COPD and emphysema is being followed by pulmonology and cardiology last seen by Dr. Ortega-cardiology 10/2016, patient and wife do not remember the last time they have been seen by pulmonology.    History of hyperlipidemia, hypertension, aneurysm of the abdominal aorta, and coronary artery disease with coronary atherosclerosis managed by cardiology Dr. Ortega.  Blood pressure is well controlled without medication, deny taking blood pressure at home regularly, check intermittently in the co-op where he lives.  Patient takes Aspirin 81 mg daily and atorvastatin 40 mg daily.   Denies any side effects of medication such as excessive bleeding/bruising or muscle pain  Denies headache, vision changes, chest pain, palpitations, racing or skipped heart beats.    History COPD and emphysema currently managed with Spiriva 18 mcg inhalation daily.   Patient reports shortness of breath and wheezing that worsens with activity and is relieved by rest, has slowly progressively worsened over the last 6 months.  hsa not seen his pulmonologist, and does not want to follow up with speciality services.    Denies shortness of breath at night or sleep disturbances related to dyspnea.    History of urinary symptoms and bladder calculus. Is managed by urology-has an upcoming appointment with urologist.  Taking Proscar 5 mg and Flomax 0.4 mg to manage symptoms.  Denies side effects of medications-such as light headedness, dizziness, or hypotension.     History of diabetes managed by metformin 500 mg once daily.  Denies regular checks of blood sugars at home.  Denies any signs or symptoms of hypo or hyperglycemia.   Denies side effects to medications.    Reviewed past medical/surgical history, family history, social history, medications and updated chart below.   No Known  Allergies  No past medical history on file.  Past Surgical History:   Procedure Laterality Date     WI HEMORRHOIDECTOMY INTERNAL RUBBER BAND LIGATIONS      Description: Hemorrhoidectomy;  Recorded: 05/29/2013;  Comments: September 2004- 2 quadrant  hemmoroidectomy and proctoplasty of the rectal mucosa prolapse.     WI REMOVAL OF TONSILS,<11 Y/O      Description: Tonsillectomy;  Recorded: 09/10/2012;  Comments: tosilectomy as a child     WI REMOVE BLADDER STONE,<2.5 CM      Description: Cystoscopy With Fragmentation Of Bladder Calculus;  Recorded: 03/01/2013;  Comments: May 2010 by Dr. Florez at [facility] ; ; November 20th 2012 by Dr. Garner At [facility]     Social History     Social History     Marital status:      Spouse name: N/A     Number of children: N/A     Years of education: N/A     Occupational History     Not on file.     Social History Main Topics     Smoking status: Former Smoker     Packs/day: 2.00     Years: 45.00     Quit date: 6/22/2003     Smokeless tobacco: Not on file     Alcohol use Not on file     Drug use: Not on file     Sexual activity: Not on file     Other Topics Concern     Not on file     Social History Narrative     Patient Active Problem List    Diagnosis Date Noted     Urinary Tract Infection      Joint Pain, Localized In The Knee      Emphysema      Urinary Symptoms      Benign Adenomatous Polyp Of The Large Intestine      Hypertension      Coronary Artery Stenosis      Automatic Atrial Tachycardia      Bladder Calculus      Abrasion Of The Left Hand      Hypercholesteremia      Aneurysm Of The Abdominal Aorta      Chronic Obstructive Pulmonary Disease      Blood In Urine      Ataxia      Diverticulosis        Review of Systems   Constitutional: Negative.   HENT: Negative.   Eyes: Negative.   Respiratory: Shortness of breath and wheezing with activity   Cardiovascular: Negative.   Gastrointestinal: Negative.   Endocrine: Negative.   Genitourinary: Negative.  "  Musculoskeletal: Negative.   Skin: Negative.   Allergic/Immunologic: Negative.   Neurological: Negative.   Hematological: Negative.   Psychiatric/Behavioral: Negative.     Objective:    Physical Exam   Visit Vitals     /76 (Patient Site: Left Arm, Patient Position: Sitting, Cuff Size: Adult Large)     Pulse 82     Temp 97.6  F (36.4  C) (Oral)     Ht 5' 7\" (1.702 m)     Wt 213 lb (96.6 kg)     SpO2 97%     BMI 33.36 kg/m2     Current Outpatient Prescriptions on File Prior to Visit   Medication Sig Dispense Refill     aspirin 81 MG EC tablet Take 81 mg by mouth daily.       finasteride (PROSCAR) 5 mg tablet TAKE ONE TABLET BY MOUTH ONCE DAILY 90 tablet 0     tamsulosin (FLOMAX) 0.4 mg Cp24 TAKE ONE CAPSULE BY MOUTH ONCE DAILY 90 capsule 2     tiotropium (SPIRIVA) 18 mcg inhalation capsule Place 1 capsule (2 puffs total) into inhaler and inhale daily. 90 capsule 0     No current facility-administered medications on file prior to visit.      Constitutional: Alert and oriented x3, well nourished without any acute distress  HENT:   Right Ear: External ear normal.   Left Ear: External ear normal.   Nose: Nose normal.   Mouth/Throat: Oropharynx is clear and moist.   Eyes: Conjunctivae and EOM are normal. Pupils are equal, round, and reactive to light. Right eye exhibits no discharge. Left eye exhibits no discharge.   Neck: No thyromegaly present.   Lymphadenopathy: Without palpable lymphadenopathy  Cardiovascular: Normal S1 and S2. Regular rate, rhythm and no murmur, rubs or gallops. Palpable distal pulses bilaterally.  Pulmonary/Chest: Normal effort. Lungs diminished to ascultation bilateral lower lobes. Without wheezing, rhonchi or crackles.    Abdominal: Soft, non tenderness. There is no rebound or guarding. Bowel sounds x4. Without organomegaly. No CVA tenderness.  Musculoskeletal: 5/5 strength  And full ROM in all extremities. No joint swelling or deformity  Neurological: Cranial nerves intact, without " deficit. Without numbness, tingling or paresthesia. Normal reflexes  Skin: Dry and intact; without rashes or lesions.   Psychiatric: Normal mood and affect.       Assessment/Plan:    1. Essential hypertension with goal blood pressure less than 140/90,   Blood pressure well controlled today.   Vitals:    02/14/17 0919   BP: 122/76   Pulse: 82   Temp: 97.6  F (36.4  C)   SpO2: 97%     I will order labs to be done today and follow up with results once received.   For hypertension discussed checking blood pressure same time daily and place in log.   Recommended seated for 10 minutes prior to taking blood pressure. Also discussed lifestyle changes, decreasing salt intake to no more than 2 grams per day and increasing exercise.  Follow up when discussed and bring log with for review. Also bring in home blood pressure cuff at next office visit to make sure it correlates with office reading.  Recommended annual physical exam or sooner for any acute concerns.   Follow up in any questions or concern. Patient agreed with plan.    - HM2(CBC w/o Differential)  - Basic Metabolic Panel    2. Coronary atherosclerosis due to lipid rich plaque/ Hypercholesteremia/AAA  Patient is managed on Atorvastatin 40 mg PO once daily and takes 81 mg Aspirin PO daily for blood clot prophylaxis.  Will continue with this regimen and have patient to continue to be managed by cardiology, Dr. Pate.  Discussed red flags that would warrant urgent evaluation. Patient verbalized understanding.  Patient agreed and appeared pleased with plan    3. Chronic Obstructive Pulmonary Disease  Patient is managed on Spiriva 18 mcg inhalation once daily. Lung sounds diminished bilaterally in the lower lobes, no wheezing present.  Reinforced education about correct usage of inhalers.  Pharmacist, María Nagy, met with patient to discuss medication management - please see her note today.  Recommended Albuterol inhaler for intermittent symptoms of wheezing and  shortness of breath.  Declined any new inhalers or change in regimen today  Patient's oxygen saturations were 97% on room air.  Recommend patient follows up with pulmonology to assess medication management for COPD and emphysema.  Discussed red flags that would warrant urgent evaluation. Patient verbalized understanding.  Recommended annual physical exam or sooner for any acute concerns.   Patient agreed and appeared pleased with plan    4. Diabetes mellitus, type II  Patient is currently managed on 500 mg metformin daily. Does not check regular blood sugars.  I will order a hemoglobin A1C and micro albumin today to assess medication management.   Hemoglobin A1C came back at 7.0, patient talked with pharmacist about medication regimen, recommended stopping metformin at this time-will reassess hemoglobin A1C in a few months.  Discussed red flags that would warrant urgent evaluation. Patient verbalized understanding.  Recommended annual physical exam or sooner for any acute concerns.   Patient agreed and appeared pleased with plan    Delta did decline any colonoscopy, preventative screening.   Continue to follow with urology for urinary diagnoses, bladder calculi and PSA    - Glycosylated Hemoglobin A1C  - Microalbumin, Random Urine    Addendum:  I have seen and examined Delta Barrera with student Estefanía Olea. I agree with the above note.     Cortney Hernandez   Family Nurse Practitioner  [facility]  Cortney Hernandez, CNP  2/15/2017

## 2021-06-16 PROBLEM — N40.1 BENIGN PROSTATIC HYPERPLASIA WITH URINARY RETENTION: Status: ACTIVE | Noted: 2019-01-12
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
## 2021-06-16 PROBLEM — L03.90 CELLULITIS: Status: ACTIVE | Noted: 2020-01-06

## 2021-06-16 PROBLEM — R33.8 BENIGN PROSTATIC HYPERPLASIA WITH URINARY RETENTION: Status: ACTIVE | Noted: 2019-01-12

## 2021-06-16 PROBLEM — L03.114 CELLULITIS OF LEFT UPPER EXTREMITY: Status: ACTIVE | Noted: 2020-01-06

## 2021-06-16 PROBLEM — E11.65 TYPE 2 DIABETES MELLITUS WITH HYPERGLYCEMIA, WITHOUT LONG-TERM CURRENT USE OF INSULIN (H): Status: ACTIVE | Noted: 2019-01-08

## 2021-06-16 PROBLEM — R26.89 ANTALGIC GAIT: Status: ACTIVE | Noted: 2021-02-07

## 2021-06-16 PROBLEM — F03.90 DEMENTIA WITHOUT BEHAVIORAL DISTURBANCE (H): Status: ACTIVE | Noted: 2019-01-24

## 2021-06-16 PROBLEM — I47.19 ATRIAL TACHYCARDIA, MULTIFOCAL (H): Status: ACTIVE | Noted: 2018-12-29

## 2021-06-16 PROBLEM — Z71.89 ACP (ADVANCE CARE PLANNING): Status: ACTIVE | Noted: 2020-01-11

## 2021-06-16 NOTE — PROGRESS NOTES
"  Subjective:      Delta Barrera is a 83 y.o. male who presents today to discuss medication management.    83-year-old gentleman with COPD and emphysema is being followed by pulmonology and cardiology last seen by Dr. Ortega-cardiology 3/2018, patient and wife do not remember the last time they have been seen by pulmonary and they currently decline any further treatment.     History of hyperlipidemia, hypertension, aneurysm of the abdominal aorta- declined intervention and continue to watch, and coronary artery disease with coronary atherosclerosis managed by cardiology Dr. Ortega.  Blood pressure is well controlled without medication, deny taking blood pressure at home regularly, check intermittently in the co-op where he lives.  Patient takes Aspirin 81 mg daily and atorvastatin 40 mg daily.   Denies any side effects of medication such as excessive bleeding/bruising or muscle pain  Denies headache, vision changes, chest pain, palpitations, racing or skipped heart beats.    History COPD and emphysema currently managed with Spiriva 18 mcg inhalation daily. States he forgets half of the time.Does not want any further treatment of his COPD or emphysema.    Patient reports shortness of breath and wheezing that worsens with activity and is relieved by rest and states \"I manage just fine\"  Has not seen his pulmonologist, and does not want to follow up with speciality services.    Denies shortness of breath at night or sleep disturbances related to dyspnea.  Declined referral to pulmonary    History of urinary symptoms and bladder calculus. Is managed by urology-has an upcoming appointment with urologist.  Taking Proscar 5 mg and Flomax 0.4 mg to manage symptoms.  Denies side effects of medications-such as light headedness, dizziness, or hypotension.   Followed by [facility]    History of diabetes managed stopped metformin does not want to take all of these medications.   Denies regular checks of blood sugars " at home.  Denies any signs or symptoms of hypo or hyperglycemia.   Denies side effects to medications.    Followed by María Nagy, Pharm D for education     Reviewed past medical/surgical history, family history, social history, medications and updated chart below.   No Known Allergies  No past medical history on file.  Past Surgical History:   Procedure Laterality Date     SD HEMORRHOIDECTOMY INTERNAL RUBBER BAND LIGATIONS      Description: Hemorrhoidectomy;  Recorded: 05/29/2013;  Comments: September 2004- 2 quadrant  hemmoroidectomy and proctoplasty of the rectal mucosa prolapse.     SD REMOVAL OF TONSILS,<13 Y/O      Description: Tonsillectomy;  Recorded: 09/10/2012;  Comments: tosilectomy as a child     SD REMOVE BLADDER STONE,<2.5 CM      Description: Cystoscopy With Fragmentation Of Bladder Calculus;  Recorded: 03/01/2013;  Comments: May 2010 by Dr. Florez at [facility] ; ; November 20th 2012 by Dr. Garner At [facility]     Social History     Social History     Marital status:      Spouse name: N/A     Number of children: N/A     Years of education: N/A     Occupational History     Not on file.     Social History Main Topics     Smoking status: Former Smoker     Packs/day: 2.00     Years: 45.00     Quit date: 6/22/2003     Smokeless tobacco: Never Used     Alcohol use Not on file     Drug use: Not on file     Sexual activity: Not on file     Other Topics Concern     Not on file     Social History Narrative     Patient Active Problem List    Diagnosis Date Noted     Urinary Tract Infection      Joint Pain, Localized In The Knee      Emphysema      Urinary Symptoms      Benign Adenomatous Polyp Of The Large Intestine      Hypertension      Coronary artery disease due to lipid rich plaque      Automatic Atrial Tachycardia      Bladder Calculus      Abrasion Of The Left Hand      Hypercholesteremia      Aneurysm Of The Abdominal Aorta      Chronic Obstructive Pulmonary Disease      Blood In Urine       Ataxia      Diverticulosis        Review of Systems   Constitutional: Negative.   HENT: Negative.   Eyes: Negative.   Respiratory: Negative  Cardiovascular: Negative.   Gastrointestinal: Negative.   Endocrine: Negative.   Genitourinary: Negative.   Musculoskeletal: Negative.   Skin: Negative.   Allergic/Immunologic: Negative.   Neurological: Negative.   Hematological: Negative.   Psychiatric/Behavioral: Negative.     Objective:    Physical Exam   /82 (Patient Site: Left Arm, Patient Position: Sitting, Cuff Size: Adult Large)  Pulse 94  Wt 208 lb (94.3 kg)  SpO2 95%  BMI 32.58 kg/m2  Current Outpatient Prescriptions on File Prior to Visit   Medication Sig Dispense Refill     aspirin 81 MG EC tablet Take 81 mg by mouth daily.       atorvastatin (LIPITOR) 40 MG tablet TAKE ONE TABLET (40 MG TOTAL) BY MOUTH ONCE DAILY 30 tablet 0     finasteride (PROSCAR) 5 mg tablet TAKE ONE TABLET BY MOUTH ONCE DAILY 30 tablet 0     tamsulosin (FLOMAX) 0.4 mg Cp24 TAKE ONE CAPSULE BY MOUTH ONCE DAILY 90 capsule 3     No current facility-administered medications on file prior to visit.      Constitutional: Alert and oriented x3, well nourished without any acute distress  HENT:   Right Ear: External ear normal.   Left Ear: External ear normal.   Nose: Nose normal.   Mouth/Throat: Oropharynx is clear and moist.   Eyes: Conjunctivae and EOM are normal. Pupils are equal, round, and reactive to light. Right eye exhibits no discharge. Left eye exhibits no discharge.   Neck: No thyromegaly present.   Lymphadenopathy: Without palpable lymphadenopathy  Cardiovascular: Normal S1 and S2. Regular rate, rhythm and no murmur, rubs or gallops. Palpable distal pulses bilaterally.  Pulmonary/Chest: Normal effort. Lungs diminished to ascultation bilateral lower lobes. Without wheezing, rhonchi or crackles.    Abdominal: Soft, non tenderness. There is no rebound or guarding. Bowel sounds x4. Without organomegaly. No CVA  tenderness.  Musculoskeletal: 5/5 strength  And full ROM in all extremities. No joint swelling or deformity  Neurological: Cranial nerves intact, without deficit. Without numbness, tingling or paresthesia. Normal reflexes  Skin: Dry and intact; without rashes or lesions.   Psychiatric: Normal mood and affect.       Assessment/Plan:    1. Essential hypertension with goal blood pressure less than 140/90,   Blood pressure well controlled today.   Vitals:    03/09/18 1105   BP: 132/82   Pulse: 94   SpO2: 95%     Continue with established regimen. Delta did agree with plan.     2. Coronary atherosclerosis due to lipid rich plaque/ Hypercholesteremia/AAA  Patient is managed on Atorvastatin 40 mg PO once daily and takes 81 mg Aspirin PO daily for blood clot prophylaxis.  Will continue with this regimen and have patient to continue to be managed by cardiology, Dr. Pate.  Discussed red flags that would warrant urgent evaluation. Patient verbalized understanding.  Patient agreed and appeared pleased with plan    3. Chronic Obstructive Pulmonary Disease  Patient is managed on Spiriva 18 mcg inhalation once daily. Lung sounds diminished bilaterally in the lower lobes, no wheezing present.  Reinforced education about correct usage of inhalers. Declined referral to pulmonary today  Pharmacist, María Nagy, met with patient to discuss medication management - please see her note today.  Recommended Albuterol inhaler for intermittent symptoms of wheezing and shortness of breath.  Declined any new inhalers or change in regimen today  Recommend patient follows up with pulmonology to assess medication management for COPD and emphysema.  Discussed red flags that would warrant urgent evaluation. Patient verbalized understanding.  Patient an wife agreed and appeared pleased with plan    4. Diabetes  I will check labs today. Currently declining any use of Metformin.   Continue to follow with María Nagy, Pharm D for management.   -  Glycosylated Hemoglobin A1c  - [facility] RED  - Ambulatory referral to Ophthalmology  - Microalbumin, Random Urine; Future    5. Skin abnormalities  Referral placed to Dermatology per Delta's request.   - Ambulatory referral to Dermatology      Delta did decline any colonoscopy, preventative screening.   Continue to follow with urology for urinary diagnoses, bladder calculi and PSA    Cortney Hernandez, MICHAEL  3/9/2018

## 2021-06-16 NOTE — PROGRESS NOTES
[facility] Follow-up Note    Assessment & Plan        1. Coronary artery disease due to lipid rich plaque -angiography November 2012 showed a normal left main, mid left anterior descending had an 85% lesion. Circumflex and right coronary artery had minimal disease. He underwent bare metal stent to the mid LAD and is getting along well.  Given his increased shortness of breath I wanted to proceed with stress testing looking for further ischemia.  His wife has declined this and thus we are considering him medical therapy and conservative therapy.   2. Hypercholesteremia -cholesterol was 88 and LDL 31 when last checked in October 2016 and will draw lipid profile today as well as renal profile.   3. Hypertension -under good control currently.   4. Aneurysm Of The Abdominal Aorta  -in March 2014 this was 5.9 cm wide and 13 cm long.  He has not seen Dr. Javier Manning in some time and I wanted to schedule CAT scan of this and the wife declined.  I think he would be a candidate for an endoluminal graft and once again, wife considers this done 4 years ago, and does not want to address it even with endoluminal graft.    5. Emphysema -stable at this point time and all inhalers discontinued by wife.   6.  Frequent atrial ectopy- ECG shows sinus rhythm with probable multifocal atrial tachycardia.  No atrial fibrillation yet.  Should he develop atrial fibrillation with then need to consider anticoagulant therapy.  7.  Dementia -wife is making all medical decisions for him.    Plan  1.  Check renal profile and fasting lipids today.  2.  Follow-up with me in 1 year or sooner if needed.    Subjective  CC: 83-year-old white gentleman being seen in yearly follow-up after having not seen me since 2016.  He admits to some shortness of breath and heavy activity but denies any PND, orthopnea, chest discomfort, palpitations, or significant peripheral edema.    Medications  Current Outpatient Prescriptions   Medication Sig  "    aspirin 81 MG EC tablet Take 81 mg by mouth daily.     atorvastatin (LIPITOR) 40 MG tablet TAKE ONE TABLET (40 MG TOTAL) BY MOUTH ONCE DAILY     finasteride (PROSCAR) 5 mg tablet TAKE ONE TABLET BY MOUTH ONCE DAILY     tamsulosin (FLOMAX) 0.4 mg Cp24 TAKE ONE CAPSULE BY MOUTH ONCE DAILY       Objective  /72 (Patient Site: Left Arm, Patient Position: Sitting, Cuff Size: Adult Regular)  Pulse 84  Resp 20  Ht 5' 7\" (1.702 m)  Wt 207 lb (93.9 kg)  BMI 32.42 kg/m2    General Appearance:    Alert, cooperative, no distress, appears stated age, mildly obese   Head:    Normocephalic, without obvious abnormality, atraumatic   Throat:   Lips, mucosa, and tongue normal; teeth and gums normal   Neck:   Supple, symmetrical, trachea midline, no adenopathy;        thyroid:  No enlargement/tenderness/nodules; no carotid    bruit or JVD   Back:     Symmetric, no curvature, ROM normal, no CVA tenderness   Lungs:     Clear to auscultation bilaterally, respirations unlabored   Chest wall:    No tenderness or deformity   Heart:    Regular rate and rhythm, frequent ectopy, S1 and S2 normal, no murmur, rub   or gallop   Abdomen:     Soft, non-tender, bowel sounds active all four quadrants,     no masses, no organomegaly   Extremities:   Normal, atraumatic, no cyanosis or edema   Pulses:   2+ and symmetric all extremities   Skin:   Skin color, texture, turgor normal, no rashes or lesions     Results    Lab Results personally reviewed   Lab Results   Component Value Date    CHOL 88 10/27/2016    CHOL 178 04/05/2010     Lab Results   Component Value Date    HDL 40 10/27/2016    HDL 52 04/05/2010     Lab Results   Component Value Date    LDLCALC 31 10/27/2016    LDLCALC 102.4 04/05/2010     Lab Results   Component Value Date    TRIG 86 10/27/2016    TRIG 118 04/05/2010     Lab Results   Component Value Date    WBC 6.9 02/14/2017    HGB 16.7 02/14/2017    HCT 49.0 02/14/2017     02/14/2017     Lab Results   Component Value " Date    CREATININE 0.93 02/14/2017    BUN 12 02/14/2017     02/14/2017    K 4.1 02/14/2017    CO2 25 02/14/2017     Reviewed electrocardiogram sinus rhythm with what looks to be multifocal atrial tachycardia, right bundle branch block pattern with nonspecific changes    Review of Systems:   General: WNL  Eyes: WNL  Ears/Nose/Throat: WNL  Lungs: WNL  Heart: WNL  Stomach: WNL  Bladder: WNL  Muscle/Joints: WNL  Skin: WNL  Nervous System: WNL  Mental Health: WNL     Blood: WNL

## 2021-06-16 NOTE — PROGRESS NOTES
MTM Encounter    ASSESSMENT AND PLAN    1. Type 2 diabetes mellitus   A1c is meeting goal of <8% for his elderly patient, although it did increase from 7% six months ago when we stopped his metformin. Discussed options with the patient of restarting low dose metformin or focusing on lifestyle modification, especially since he's been having dietary indiscretion lately. He doesn't want to take more medication, so agreed to eat healthier. We will plan on checking A1c in 3 months to monitor.   Plan   1. Check A1c.    2. CAD/HTN   Blood pressure is well controlled and meeting goal of <140/90 mm Hg without use of anti-hypertensives.     3. Hypercholesteremia  Appropriately on a high intensity statin given age, ASCVD history, and diabetes diagnosis per ACC/AHA guidelines.     4. Chronic Obstructive Pulmonary Disease  Not optimally controlled. Patient is noncompliant with LAMA inhaler due to lack of perceived benefit. Would recommend combination LAMA/LABA inhaler. Despite education and explanation of symptomatic benefit, patient declined. Did encourage him to carry albuterol inhaler with him for as needed use.   Plan   1. Recommend starting LAMA/LABA inhaler; patient declined.    5. Benign prostatic hyperplasia  Stable with no urination concerns. Followed by urology.           Return in about 3 months (around 6/9/2018).      SUBJECTIVE AND OBJECTIVE  Delta Barrera is a 83 y.o. male here for a medication therapy management (MTM) appointment.     1. Type 2 diabetes mellitus   Delta is not taking any medication for diabetes. At our last visit, we stopped low dose metformin as he wanted to take less medication and his A1c was well controlled. He does not check his blood sugars at home. His wife says he's been eating a lot of cake lately.     Lab Results   Component Value Date    HGBA1C 7.8 (H) 03/09/2018       2. HTN/CAD  Delta is not taking any medication for high blood pressure. He does have a history of CAD with  coronary artery stenosis. He had a bare metal stent placed in 2012. He takes a low dose aspirin daily. He was previously on metoprolol, but this was stopped, possibly due to low blood pressure or issues with breathing/COPD.     BP Readings from Last 3 Encounters:   03/09/18 132/82   03/05/18 120/72   02/14/17 122/76     3. Hypercholesteremia  Delta is taking atorvastatin 40 mg daily. He has a history of CAD with stent placement. He sees cardiology.    4. Chronic Obstructive Pulmonary Disease  Delta has not been using Spiriva. He doesn't think it makes a difference with his breathing, but then says his breathing is not well controlled as he can't walk very far without having shortness of breath. His wife doesn't want to nag him about using an inhaler. She also doesn't think it would help his breathing. He did get the rescue inhaler prescribed at our last visit, but hasn't used it and doesn't think he needs to.     5. Benign prostatic hyperplasia  Delta is taking finasteride 5 mg daily and tamsulosin 0.4 mg daily. He does see a urologist. He has a history of bladder stones. He denies any issues with urination.           Delta's medication list was reviewed with them, discussing reason for use, directions for use, and potential side effects of each medication as needed. Indication, safety, efficacy, and convenience was assessed for all medications addressed above.  No environmental factors were noted currently affecting patient.  This care plan was communicated via EMR with his primary care provider, Cortney Hernandez NP, who is the authorizing prescriber for this visit.  Direct supervision was available by either the patient's PCP or other available provider.    Time Spent: 25 minutes  Time and complexity billing metrics are included in the doc-flowsheet linked to this visit.       María Nagy, PharmD, BCACP    Current Outpatient Prescriptions   Medication Sig Dispense Refill     albuterol (PROAIR  HFA;PROVENTIL HFA;VENTOLIN HFA) 90 mcg/actuation inhaler Inhale 2 puffs every 6 (six) hours as needed.       aspirin 81 MG EC tablet Take 81 mg by mouth daily.       atorvastatin (LIPITOR) 40 MG tablet TAKE ONE TABLET (40 MG TOTAL) BY MOUTH ONCE DAILY 30 tablet 0     finasteride (PROSCAR) 5 mg tablet TAKE ONE TABLET BY MOUTH ONCE DAILY 30 tablet 0     tamsulosin (FLOMAX) 0.4 mg Cp24 TAKE ONE CAPSULE BY MOUTH ONCE DAILY 90 capsule 3     No current facility-administered medications for this visit.

## 2021-06-18 NOTE — LETTER
Letter by Bishnu Alonso NP at      Author: Bishnu Alonso NP Service: -- Author Type: --    Filed:  Encounter Date: 1/8/2019 Status: (Other)         Patient: Delta Barrera   MR Number: [id number]   YOB: 1934   Date of Visit: 1/8/2019     [facility]    Facility:   [facility] [[id number]]   Code Status: DNR      CHIEF COMPLAINT/REASON FOR VISIT:  Chief Complaint   Patient presents with   ? Review Of Multiple Medical Conditions       HISTORY:      HPI: Delta is a 84 y.o. male with recent history of hospitalization at [facility] from 12/28/18-1/1/19 for influenza A and COPD exacerbation.  The hospital summary is as follows:    84-year-old male with history of dementia, COPD, chronic hypoxic respiratory failure (refused supplemental oxygen) presented to ER with fever and shortness of breath.  He had lactic acidosis on admission. He was diagnosed with sepsis and COPD exacerbation 2/2 influenza A. Resuscitated with IV fluid and started on Tamiflu, systemic steroid and nebbulizer. He had multifocal atrial tachycardia with rapid ventricular response on admission.  Started on Cardizem.  Patient has history of dementia, was agitated in the floor, required one-to-one sitter.     He eventually discharged to TCU in a stable. Hospice to follow the patient after discharge per family request.    Mr. Barrera is being seen today for a review of multiple medical conditions.  He is a well-groomed, pleasantly confused gentleman.  He was oriented to person, disoriented to place, time, and situation at this visit.  Mr. Barrera lives with his wife at home and is planning on discharging from TCU with home hospice.  The patient denies any concerns at this time.   PT reported that patient has been progressing well with therapy for his BLE weakness.  The patient denied lightheadedness, dizziness, breathing difficulty, chest pain, palpitations,  constipation, urinary symptoms, numbness or tingling in extremities, and pain.  No other issues reported.      Past Medical History:   Diagnosis Date   ? COPD (chronic obstructive pulmonary disease) (H)     Created by Conversion    ? Dementia    ? Hypercholesteremia     Created by Conversion    ? Hypertension     Created by Conversion  Replacement Utility updated for latest IMO load             No family history on file.  Social History     Socioeconomic History   ? Marital status:      Spouse name: Not on file   ? Number of children: Not on file   ? Years of education: Not on file   ? Highest education level: Not on file   Social Needs   ? Financial resource strain: Not on file   ? Food insecurity - worry: Not on file   ? Food insecurity - inability: Not on file   ? Transportation needs - medical: Not on file   ? Transportation needs - non-medical: Not on file   Occupational History   ? Not on file   Tobacco Use   ? Smoking status: Former Smoker     Packs/day: 2.00     Years: 45.00     Pack years: 90.00     Last attempt to quit: 6/22/2003     Years since quitting: 15.5   ? Smokeless tobacco: Never Used   Substance and Sexual Activity   ? Alcohol use: Not on file   ? Drug use: Not on file   ? Sexual activity: Not on file   Other Topics Concern   ? Not on file   Social History Narrative   ? Not on file       REVIEW OF SYSTEM:  Pertinent items are noted in HPI.  A 12 point comprehensive review of systems was negative except as noted.  ROS limited due to patient's cognitive impairment.    PHYSICAL EXAM:   /89   Pulse 78   Temp 97  F (36.1  C)   Resp 16   Wt 201 lb (91.2 kg)   SpO2 99%   BMI 31.48 kg/m       General Appearance:    Alert, cooperative, no distress, appears stated age   Head:    Normocephalic, without obvious abnormality, atraumatic   Eyes:    PERRL, conjunctiva/corneas clear, both eyes        Ears:    Normal external ear canals, both ears   Nose:   Nares normal, septum midline, mucosa  normal, no drainage    or sinus tenderness   Throat:   Lips, mucosa, and tongue normal; teeth and gums normal   Neck:   Supple, symmetrical, trachea midline   Back:     Symmetric, no curvature, ROM normal   Lungs:     Expiratory wheezing in all lung fields, respirations unlabored   Chest wall:    No tenderness or deformity   Heart:    Regular rate and rhythm, S1 and S2 normal, no murmur, rub   or gallop   Abdomen:     Soft, non-tender, bowel sounds active all four quadrants,     no masses, no organomegaly   Extremities:   Extremities normal, atraumatic, no cyanosis, mild nonpitting edema in BLE   Pulses:   2+ and symmetric all extremities   Skin:   Skin color, texture, turgor normal, no rashes or lesions   Lymph nodes:   Cervical, supraclavicular, and axillary nodes normal   Neurologic:   Bilateral lower extremity weakness         LABS:   Results for orders placed or performed in visit on 01/07/19   Basic Metabolic Panel   Result Value Ref Range    Sodium 140 136 - 145 mmol/L    Potassium 3.6 3.5 - 5.0 mmol/L    Chloride 105 98 - 107 mmol/L    CO2 24 22 - 31 mmol/L    Anion Gap, Calculation 11 5 - 18 mmol/L    Glucose 265 (H) 70 - 125 mg/dL    Calcium 8.3 (L) 8.5 - 10.5 mg/dL    BUN 18 8 - 28 mg/dL    Creatinine 0.84 0.70 - 1.30 mg/dL    GFR MDRD Af Amer >60 >60 mL/min/1.73m2    GFR MDRD Non Af Amer >60 >60 mL/min/1.73m2     Lab Results   Component Value Date    WBC 13.1 (H) 01/07/2019    HGB 15.4 01/07/2019    HCT 46.8 01/07/2019    MCV 92 01/07/2019     01/07/2019       Assessment/Plan:    COPD excerebration with Influenza A/H1N1 infection: received tamiflu in hospital, still has respiratory issues, continue nebs and prednisone burst x3d    Leukocytosis: last WBC 13.1, probably d/t steroid burst, afebrile    A-fib/atrial tachycardia: continue diltiazem 120mg daily, rate controlled, HR <80    BPH: continue tamsulosin 0.4mg daily, no change    Vit D def: last level 9.2, start D3 3000U daily    DM: last A1c  7.8, oral intake variable, monitor BS two times a day x5d    Insomnia: continue melatonin 3mg at HS    Hyperlipidemia: continue atorvastatin 40mg at HS    Disposition: yet to be decided, waiting for official cognitive scores and therapy recommendation      The care plan has been reviewed and all orders signed. Changes to care plan, if any, as noted. Otherwise, continue care plan of care.  The total time spent with this patient was greater than 25 minutes, with greater than 50% spent in counseling and coordination of care that included review of care and communication with therapy.    Electronically signed by: Bishnu Alonso NP

## 2021-06-18 NOTE — LETTER
Letter by Bishnu Alonso NP at      Author: Bishnu Alonso NP Service: -- Author Type: --    Filed:  Encounter Date: 1/11/2019 Status: (Other)         Patient: Delta Barrera   MR Number: [id number]   YOB: 1934   Date of Visit: 1/11/2019     [facility]    Facility:   [facility] [[id number]]   Code Status: DNR      CHIEF COMPLAINT/REASON FOR VISIT:  Chief Complaint   Patient presents with   ? Discharge Summary       HISTORY:      HPI: Delta is a 84 y.o. male with recent history of hospitalization at [facility] from 12/28/18-1/1/19 for influenza A and COPD exacerbation.  The hospital summary is as follows:    84-year-old male with history of dementia, COPD, chronic hypoxic respiratory failure (refused supplemental oxygen) presented to ER with fever and shortness of breath.  He had lactic acidosis on admission. He was diagnosed with sepsis and COPD exacerbation 2/2 influenza A. Resuscitated with IV fluid and started on Tamiflu, systemic steroid and nebbulizer. He had multifocal atrial tachycardia with rapid ventricular response on admission.  Started on Cardizem.  Patient has history of dementia, was agitated in the floor, required one-to-one sitter.     He eventually discharged to TCU in a stable. Hospice to follow the patient after discharge per family request.    Mr. Barrera is being seen today for a review of multiple medical conditions.  He is a well-groomed, pleasantly confused gentleman.  He was oriented to person, disoriented to place, time, and situation at this visit.  Mr. Barrera lives with his wife at home and is planning on discharging from TCU with home hospice.  The patient denies any concerns at this time.   PT reported that patient has been progressing well with therapy for his BLE weakness.  The patient denied lightheadedness, dizziness, breathing difficulty, chest pain, palpitations, constipation, urinary  symptoms, numbness or tingling in extremities, and pain.  No other issues reported. He will be discharged to home with services.       Past Medical History:   Diagnosis Date   ? COPD (chronic obstructive pulmonary disease) (H)     Created by Conversion    ? Dementia    ? Hypercholesteremia     Created by Conversion    ? Hypertension     Created by Conversion  Replacement Utility updated for latest IMO load             No family history on file.  Social History     Socioeconomic History   ? Marital status:      Spouse name: Not on file   ? Number of children: Not on file   ? Years of education: Not on file   ? Highest education level: Not on file   Social Needs   ? Financial resource strain: Not on file   ? Food insecurity - worry: Not on file   ? Food insecurity - inability: Not on file   ? Transportation needs - medical: Not on file   ? Transportation needs - non-medical: Not on file   Occupational History   ? Not on file   Tobacco Use   ? Smoking status: Former Smoker     Packs/day: 2.00     Years: 45.00     Pack years: 90.00     Last attempt to quit: 6/22/2003     Years since quitting: 15.5   ? Smokeless tobacco: Never Used   Substance and Sexual Activity   ? Alcohol use: Not on file   ? Drug use: Not on file   ? Sexual activity: Not on file   Other Topics Concern   ? Not on file   Social History Narrative   ? Not on file       Current Outpatient Medications on File Prior to Visit   Medication Sig Dispense Refill   ? cholecalciferol, vitamin D3, 1,000 unit tablet Take 3,000 Units by mouth daily.     ? albuterol (PROVENTIL) 2.5 mg /3 mL (0.083 %) nebulizer solution Take 3 mL (2.5 mg total) by nebulization every 4 (four) hours as needed for shortness of breath.  0   ? albuterol (PROVENTIL) 2.5 mg /3 mL (0.083 %) nebulizer solution Take 2.5 mg by nebulization 2 (two) times a day.     ? aspirin 81 MG EC tablet Take 81 mg by mouth daily.     ? atorvastatin (LIPITOR) 40 MG tablet TAKE 1 TABLET BY MOUTH ONCE  DAILY 90 tablet 3   ? diltiazem (DILACOR XR) 120 MG 24 hr capsule Take 1 capsule (120 mg total) by mouth daily.  0   ? dorzolamide-timolol (COSOPT) 22.3-6.8 mg/mL ophthalmic solution Administer 1 drop to both eyes 2 (two) times a day.     ? latanoprost (XALATAN) 0.005 % ophthalmic solution Administer 1 drop to both eyes at bedtime.     ? melatonin 3 mg Tab tablet Take 1 tablet (3 mg total) by mouth at bedtime.  0   ? predniSONE (DELTASONE) 20 MG tablet 20 mg for three days then continue by 10 mg for another 3 days.  0   ? tamsulosin (FLOMAX) 0.4 mg Cp24 TAKE ONE CAPSULE BY MOUTH ONCE DAILY 90 capsule 3     No current facility-administered medications on file prior to visit.            REVIEW OF SYSTEM:  Pertinent items are noted in HPI.  A 12 point comprehensive review of systems was negative except as noted.  ROS limited due to patient's cognitive impairment.    PHYSICAL EXAM:   /89   Pulse 78   Temp 97  F (36.1  C)   Resp 16   Wt 201 lb (91.2 kg)   SpO2 99%   BMI 31.48 kg/m       General Appearance:    Alert, cooperative, no distress, appears stated age   Head:    Normocephalic, without obvious abnormality, atraumatic   Eyes:    PERRL, conjunctiva/corneas clear, both eyes        Ears:    Normal external ear canals, both ears   Nose:   Nares normal, septum midline, mucosa normal, no drainage    or sinus tenderness   Throat:   Lips, mucosa, and tongue normal; teeth and gums normal   Neck:   Supple, symmetrical, trachea midline   Back:     Symmetric, no curvature, ROM normal   Lungs:     Expiratory wheezing in all lung fields, respirations unlabored   Chest wall:    No tenderness or deformity   Heart:    Regular rate and rhythm, S1 and S2 normal, no murmur, rub   or gallop   Abdomen:     Soft, non-tender, bowel sounds active all four quadrants,     no masses, no organomegaly   Extremities:   Extremities normal, atraumatic, no cyanosis, mild nonpitting edema in BLE   Pulses:   2+ and symmetric all  extremities   Skin:   Skin color, texture, turgor normal, no rashes or lesions   Lymph nodes:   Cervical, supraclavicular, and axillary nodes normal   Neurologic:   Bilateral lower extremity weakness         LABS:   Results for orders placed or performed in visit on 01/07/19   Basic Metabolic Panel   Result Value Ref Range    Sodium 140 136 - 145 mmol/L    Potassium 3.6 3.5 - 5.0 mmol/L    Chloride 105 98 - 107 mmol/L    CO2 24 22 - 31 mmol/L    Anion Gap, Calculation 11 5 - 18 mmol/L    Glucose 265 (H) 70 - 125 mg/dL    Calcium 8.3 (L) 8.5 - 10.5 mg/dL    BUN 18 8 - 28 mg/dL    Creatinine 0.84 0.70 - 1.30 mg/dL    GFR MDRD Af Amer >60 >60 mL/min/1.73m2    GFR MDRD Non Af Amer >60 >60 mL/min/1.73m2     Lab Results   Component Value Date    WBC 13.1 (H) 01/07/2019    HGB 15.4 01/07/2019    HCT 46.8 01/07/2019    MCV 92 01/07/2019     01/07/2019       Assessment/Plan:    COPD excerebration with Influenza A/H1N1 infection: received tamiflu in hospital, still has respiratory issues, continue nebs and prednisone burst x3d, resolved    Leukocytosis: last WBC 13.1, probably d/t steroid burst, afebrile    A-fib/atrial tachycardia: continue diltiazem 120mg daily, rate controlled, HR <80    BPH: continue tamsulosin 0.4mg daily, no change    Vit D def: last level 9.2, start D3 3000U daily    DM: last A1c 7.8, oral intake variable, diet controlled    Insomnia: continue melatonin 3mg at HS    Hyperlipidemia: continue atorvastatin 40mg at HS    MEDICAL EQUIPMENT NEEDS:  na    DISCHARGE PLAN/FACE TO FACE:  I certify that services are/were furnished while this patient was under the care of a physician and that a physician or an allowed non-physician practitioner (NPP), had a face-to-face encounter that meets the physician face-to-face encounter requirements. The encounter was in whole, or in part, related to the primary reason for home health. The patient is confined to his/her home and needs intermittent skilled nursing,  physical therapy, speech-language pathology, or the continued need for occupational therapy. A plan of care has been established by a physician and is periodically reviewed by a physician.  Date of Face-to-Face Encounter: 1/11/19    I certify that, based on my findings, the following services are medically necessary home health services: [facility] HHA/RN and PT/OT to evaluate and treat at home.     My clinical findings support the need for the above skilled services because: patient will be discharging to home. Patient will need assistance with medication management and performing IADLs and ADLs effectively and safely at home.    Patient to re-establish plan of care with their PCP within 7 days after leaving TCU.     The care plan has been reviewed and all orders signed. Changes to care plan, if any, as noted. Otherwise, continue care plan of care.  The total time spent with this patient was greater than 40 minutes, with greater than 50% spent in counseling and coordination of care that included multiple issues per discharge.    Electronically signed by: Bishnu Alonso NP

## 2021-06-18 NOTE — PROGRESS NOTES
MTM Follow Up Encounter  Assessment & Plan                                                     1. Diabetes  A1c is still meeting goal of less than 8% given his age, without use of medication.    2. Coronary artery disease   Blood pressure is well controlled and meeting goal of <140/90 mm Hg per JNC-8 hypertension guidelines.     3. Chronic obstructive pulmonary disease  Not optimally controlled.  Patient's and his wife declined use of maintenance inhaler for COPD management. I again emphasized importance of at least having a rescue inhaler available for as needed use.  Will send in refill prescription.    4. Benign prostatic hyperplasia  Well controlled on medication regimen.         Medication Adherence/Access: Medication education provided as patient is unfamiliar with what meds he is taking and why. Of note, he is not using rescue inhaler for COPD. Discussed above. No other issues identified.     Follow Up  Return in about 6 months (around 12/22/2018).        Subjective & Objective                                                       Delta Barrera is a 83 y.o. male coming in for a follow up visit for Medication Therapy Management. He was referred to me from Cortney Hernandez NP. He is here with his wife.     1. Diabetes  Delta is not taking any medication for diabetes. Last year we stopped metformin to see if he would be able to manage with just lifestyle modification. Overall, he is feeling well. No hypoglycemia. He does not check BG at home.     Lab Results   Component Value Date    HGBA1C 7.8 (H) 06/22/2018       2. Coronary artery disease   Delta is taking atorvastatin 40 mg daily. He does have a history of CAD with coronary artery stenosis.  History of bare-metal stent placement in 2012.  He also takes low-dose aspirin daily.  He was previously on metoprolol, but this was discontinued possibly due to hypotension or issues with breathing given COPD.  Blood pressure historically has been well  controlled without medication.    3. Chronic obstructive pulmonary disease  Delta is not using any inhalers.  He does have shortness of breath with minimal exertion.  He has been on several maintenance inhalers in the past, but did not find them particularly helpful.  He has a history of smoking, quit approximately 20 years ago per his wife's report.  At a previous visit I did recommend at least using a rescue inhaler for shortness of breath when needed.  He admits to not using this.  His wife does not think inhalers would be helpful for his breathing.    4. Benign prostatic hyperplasia  Delta is taking finasteride 5 mg daily and tamsulosin 0.4 mg daily.  He does follow with a urologist.  History of bladder stones.  He denies any BPH symptoms or issues with urination.      Medication Adherence/Access: Wife administers his medications. He does not know what he is taking or why. Not using rescue inhaler for COPD. Discussed above. No other issues identified.     PMH: reviewed in EPIC   Allergies/ADRs: reviewed in EPIC   Alcohol: reviewed in EPIC   Tobacco:   History   Smoking Status     Former Smoker     Packs/day: 2.00     Years: 45.00     Quit date: 6/22/2003   Smokeless Tobacco     Never Used     Today's Vitals:   Vitals:    06/22/18 1241   BP: 130/80   Pulse: 80     ----------------    Much or all of the text in this note was generated through the use of Dragon Dictate voice-to-text software. Errors in spelling or words which seem out of context are unintentional. Sound alike errors, in particular, may have escaped editing.    The patient was given a summary of these recommendations as an after visit summary    I spent 30 minutes with this patient today; out of pocket cost for today's [facility] visit was reviewed with the patient. All changes were made via collaborative practice agreement with Cortney Hernandez NP. A copy of the visit note was provided to the patient's provider.     María Nagy, PharmD,  BCACP  Medication Management Pharmacist  [facility]        Current Outpatient Prescriptions   Medication Sig Dispense Refill     albuterol (PROAIR HFA;PROVENTIL HFA;VENTOLIN HFA) 90 mcg/actuation inhaler Inhale 2 puffs every 6 (six) hours as needed for wheezing or shortness of breath. 1 Inhaler 5     aspirin 81 MG EC tablet Take 81 mg by mouth daily.       atorvastatin (LIPITOR) 40 MG tablet TAKE 1 TABLET BY MOUTH ONCE DAILY 90 tablet 3     finasteride (PROSCAR) 5 mg tablet Take 1 tablet (5 mg total) by mouth daily. 30 tablet 0     tamsulosin (FLOMAX) 0.4 mg Cp24 TAKE ONE CAPSULE BY MOUTH ONCE DAILY 90 capsule 3     No current facility-administered medications for this visit.

## 2021-06-18 NOTE — LETTER
"Letter by Eden Madrigal MD at      Author: Eden Madrigal MD Service: -- Author Type: --    Filed:  Encounter Date: 1/6/2019 Status: (Other)         Patient: Delta Barrera   MR Number: [id number]   YOB: 1934   Date of Visit: 1/6/2019     [facility]      Facility:    [facility] [[id number]]  Code Status: DNR       Chief Complaint/Reason for Visit:  Chief Complaint   Patient presents with   ? H & P     New admit to TCU for sepsis, influenza A, COPD exac.        HPI:   Delta is a 84 y.o. male with hx of COPD, HTN, BPH, presented to the ED on 12/28/18 due to \"complaints of ongoing fever, confusion, increasing tiredness.  Patient was not able to do most of the history but patient's family members which include daughter and wife are able to provide most of the history.  Patient has refused CPAP and also oxygen and most of the time he sleeps because he is tired during the day.  Patient had [place] celebration where he met with other family members who also however running very high fever and flulike symptoms.  Patient came into the emergency department was not hypoxic but has effort of breathing and also tachypneic and tachycardic.  Patient had new onset atrial fibrillation in the emergency department which got better after getting some Cardizem.  Patient had multiple doses of neb treatments in the emergency department to help with his breathing.  Patient comfortably resting during the time of examination and denies any palpitations or shortness of breath or any chest pain during the time of exam.  Patient was admitted to the ICU because of his lactate elevation to 3.3 along with acute respiratory distress and new onset atrial fibrillation\".    Hospital discharge summary as per below:     84-year-old male with history of dementia, COPD, chronic hypoxic respiratory failure (refused supplemental oxygen) presented to ER with fever and " shortness of breath.  He had lactic acidosis on admission. He was diagnosed with sepsis and COPD exacerbation 2/2 influenza A. Resuscitated with IV fluid and started on Tamiflu, systemic steroid and nebbulizer. He had multifocal atrial tachycardia with rapid ventricular response on admission.  Started on Cardizem.  Patient has history of dementia, was agitated in the floor, required one-to-one sitter. He eventually discharged to TCU in a stable. Hospice to follow the patient after discharge per family request.     Overall stabilized and discharged to TCU on 1/1/19 for PT, OT, nursing cares, medical management and monitoring.     Today:  He reports feeling weak but overall improved from previous. No fever. He denies shortness of breath at rest. Not using oxygen. Appetite is pretty good. No diarrhea or constipation. He is hopeful to return home to his wife soon. Working with therapy. No new vision or hearing problems      Past Medical History:  Past Medical History:   Diagnosis Date   ? COPD (chronic obstructive pulmonary disease) (H)     Created by Conversion    ? Dementia    ? Hypercholesteremia     Created by Conversion    ? Hypertension     Created by Conversion  Replacement Utility updated for latest IMO load           Surgical History:  Past Surgical History:   Procedure Laterality Date   ? AK HEMORRHOIDECTOMY INTERNAL RUBBER BAND LIGATIONS      Description: Hemorrhoidectomy;  Recorded: 05/29/2013;  Comments: September 2004- 2 quadrant  hemmoroidectomy and proctoplasty of the rectal mucosa prolapse.   ? AK REMOVAL OF TONSILS,<13 Y/O      Description: Tonsillectomy;  Recorded: 09/10/2012;  Comments: tosilectomy as a child   ? AK REMOVE BLADDER STONE,<2.5 CM      Description: Cystoscopy With Fragmentation Of Bladder Calculus;  Recorded: 03/01/2013;  Comments: May 2010 by Dr. Florez at [facility] ; ; November 20th 2012 by Dr. Garner At [facility]       Family History:   No family history on file.    Social  History:    Social History     Socioeconomic History   ? Marital status:      Spouse name: Not on file   ? Number of children: Not on file   ? Years of education: Not on file   ? Highest education level: Not on file   Social Needs   ? Financial resource strain: Not on file   ? Food insecurity - worry: Not on file   ? Food insecurity - inability: Not on file   ? Transportation needs - medical: Not on file   ? Transportation needs - non-medical: Not on file   Occupational History   ? Not on file   Tobacco Use   ? Smoking status: Former Smoker     Packs/day: 2.00     Years: 45.00     Pack years: 90.00     Last attempt to quit: 6/22/2003     Years since quitting: 15.5   ? Smokeless tobacco: Never Used   Substance and Sexual Activity   ? Alcohol use: Not on file   ? Drug use: Not on file   ? Sexual activity: Not on file   Other Topics Concern   ? Not on file   Social History Narrative   ? Not on file       Medications:  Current Outpatient Medications   Medication Sig   ? albuterol (PROVENTIL) 2.5 mg /3 mL (0.083 %) nebulizer solution Take 3 mL (2.5 mg total) by nebulization every 4 (four) hours as needed for shortness of breath.   ? albuterol (PROVENTIL) 2.5 mg /3 mL (0.083 %) nebulizer solution Take 2.5 mg by nebulization 2 (two) times a day.   ? aspirin 81 MG EC tablet Take 81 mg by mouth daily.   ? atorvastatin (LIPITOR) 40 MG tablet TAKE 1 TABLET BY MOUTH ONCE DAILY   ? diltiazem (DILACOR XR) 120 MG 24 hr capsule Take 1 capsule (120 mg total) by mouth daily.   ? dorzolamide-timolol (COSOPT) 22.3-6.8 mg/mL ophthalmic solution Administer 1 drop to both eyes 2 (two) times a day.   ? latanoprost (XALATAN) 0.005 % ophthalmic solution Administer 1 drop to both eyes at bedtime.   ? melatonin 3 mg Tab tablet Take 1 tablet (3 mg total) by mouth at bedtime.   ? predniSONE (DELTASONE) 20 MG tablet 20 mg for three days then continue by 10 mg for another 3 days.   ? tamsulosin (FLOMAX) 0.4 mg Cp24 TAKE ONE CAPSULE BY MOUTH  ONCE DAILY        Allergies:  No Known Allergies      Review of Systems:  Pertinent items are noted in HPI.      Physical Exam:   General: Patient is alert elderly male, no distress.   Vitals: /68, Temp 97.6, Pulse 68, RR 16, O2 sat 94 % RA.  HEENT: Head is NCAT. Eyes show no injection or icterus. Nares negative. Oropharynx well hydrated.  Neck: Supple. No tenderness or adenopathy. No JVD.  Lungs: Clear bilaterally. No wheezes.  Cardiovascular: Regular rate and rhythm, normal S1, S2.  Back: No spinal or CVA tenderness.  Abdomen: Soft, no tenderness on exam. Bowel sounds present. No guarding rebound or rigidity.  : Deferred.  Extremities: Mild LE edema is noted.  Musculoskeletal: Age related degen changes.  Skin: No rashes.  Psych: Mood appears good.      Labs:  Results for orders placed or performed during the hospital encounter of 12/28/18   Basic Metabolic Panel   Result Value Ref Range    Sodium 141 136 - 145 mmol/L    Potassium 4.0 3.5 - 5.0 mmol/L    Chloride 107 98 - 107 mmol/L    CO2 23 22 - 31 mmol/L    Anion Gap, Calculation 11 5 - 18 mmol/L    Glucose 210 (H) 70 - 125 mg/dL    Calcium 9.1 8.5 - 10.5 mg/dL    BUN 12 8 - 28 mg/dL    Creatinine 1.17 0.70 - 1.30 mg/dL    GFR MDRD Af Amer >60 >60 mL/min/1.73m2    GFR MDRD Non Af Amer 59 (L) >60 mL/min/1.73m2       Assessment/Plan:  1. Influenza A. Completed treatment with Tamiflu.  2. COPD. Exac secondary to influenza. Received steroids. Improved.  3. Multifocal atrial tach. Started on Cardizem. Monitor HR, BP.  4. HTN. BPs satisfactory.  5. HLD. Continue home atorvastatin.  6. Code status is DNR.        Total time greater than 50 minutes, greater than 50% counseling and coordination of care, time spent in interview and examination of patient, review of records, discussion with nursing staff. CC includes management of multiple medical conditions, discussion of respiratory status, expected course in TCU.         Electronically signed by: Eden VELA  MD Kaitlin

## 2021-06-18 NOTE — LETTER
Letter by Bishnu Alonso NP at      Author: Bishnu Alonso NP Service: -- Author Type: --    Filed:  Encounter Date: 1/4/2019 Status: (Other)         Patient: Delta Barrera   MR Number: [id number]   YOB: 1934   Date of Visit: 1/4/2019     [facility]    Facility:   [facility] [[id number]]   Code Status: DNR      CHIEF COMPLAINT/REASON FOR VISIT:  Chief Complaint   Patient presents with   ? Review Of Multiple Medical Conditions     physical deconditioning, hypertension, hyperlipidemia, CAD, AAA, COPD, influenza A, and atrial tachycardia       HISTORY:      HPI: Delta is a 84 y.o. male with recent history of hospitalization at [facility] from 12/28/18-1/1/19 for influenza A and COPD exacerbation.  The hospital summary is as follows:    84-year-old male with history of dementia, COPD, chronic hypoxic respiratory failure (refused supplemental oxygen) presented to ER with fever and shortness of breath.  He had lactic acidosis on admission. He was diagnosed with sepsis and COPD exacerbation 2/2 influenza A. Resuscitated with IV fluid and started on Tamiflu, systemic steroid and nebbulizer. He had multifocal atrial tachycardia with rapid ventricular response on admission.  Started on Cardizem.  Patient has history of dementia, was agitated in the floor, required one-to-one sitter.     He eventually discharged to TCU in a stable. Hospice to follow the patient after discharge per family request.    Mr. Barrera is being seen today for a review of multiple medical conditions.  He is a well-groomed, pleasantly confused gentleman.  He was oriented to person, disoriented to place, time, and situation at this visit.  Mr. Barrera lives with his wife at home and is planning on discharging from TCU with home hospice.  The patient denies any concerns at this time.  He feels that his breathing has improved since he was really sick when he  first arrived at the hospital which is where he still believes that he is receiving care at this time.  PT reported that patient has been progressing well with therapy for his BLE weakness.  The patient denied lightheadedness, dizziness, breathing difficulty, chest pain, palpitations, constipation, urinary symptoms, numbness or tingling in extremities, and pain.  The Nursing staff do not have any concerns for the patient at this time.      Past Medical History:   Diagnosis Date   ? COPD (chronic obstructive pulmonary disease) (H)     Created by Conversion    ? Dementia    ? Hypercholesteremia     Created by Conversion    ? Hypertension     Created by Conversion  Replacement Utility updated for latest IMO load             History reviewed. No pertinent family history.  Social History     Socioeconomic History   ? Marital status:      Spouse name: None   ? Number of children: None   ? Years of education: None   ? Highest education level: None   Social Needs   ? Financial resource strain: None   ? Food insecurity - worry: None   ? Food insecurity - inability: None   ? Transportation needs - medical: None   ? Transportation needs - non-medical: None   Occupational History   ? None   Tobacco Use   ? Smoking status: Former Smoker     Packs/day: 2.00     Years: 45.00     Pack years: 90.00     Last attempt to quit: 6/22/2003     Years since quitting: 15.5   ? Smokeless tobacco: Never Used   Substance and Sexual Activity   ? Alcohol use: None   ? Drug use: None   ? Sexual activity: None   Other Topics Concern   ? None   Social History Narrative   ? None       REVIEW OF SYSTEM:  Pertinent items are noted in HPI.  A 12 point comprehensive review of systems was negative except as noted.  ROS limited due to patient's cognitive impairment.    PHYSICAL EXAM:   BP (!) 162/98   Pulse 84   Temp 98.1  F (36.7  C)   Resp 18   SpO2 93%     General Appearance:    Alert, cooperative, no distress, appears stated age   Head:     Normocephalic, without obvious abnormality, atraumatic   Eyes:    PERRL, conjunctiva/corneas clear, both eyes        Ears:    Normal external ear canals, both ears   Nose:   Nares normal, septum midline, mucosa normal, no drainage    or sinus tenderness   Throat:   Lips, mucosa, and tongue normal; teeth and gums normal   Neck:   Supple, symmetrical, trachea midline   Back:     Symmetric, no curvature, ROM normal   Lungs:     Expiratory wheezing in all lung fields, respirations unlabored   Chest wall:    No tenderness or deformity   Heart:    Regular rate and rhythm, S1 and S2 normal, no murmur, rub   or gallop   Abdomen:     Soft, non-tender, bowel sounds active all four quadrants,     no masses, no organomegaly   Extremities:   Extremities normal, atraumatic, no cyanosis, mild nonpitting edema in BLE   Pulses:   2+ and symmetric all extremities   Skin:   Skin color, texture, turgor normal, no rashes or lesions   Lymph nodes:   Cervical, supraclavicular, and axillary nodes normal   Neurologic:   Bilateral lower extremity weakness         LABS:   Results for orders placed or performed during the hospital encounter of 12/28/18   Basic Metabolic Panel   Result Value Ref Range    Sodium 141 136 - 145 mmol/L    Potassium 4.0 3.5 - 5.0 mmol/L    Chloride 107 98 - 107 mmol/L    CO2 23 22 - 31 mmol/L    Anion Gap, Calculation 11 5 - 18 mmol/L    Glucose 210 (H) 70 - 125 mg/dL    Calcium 9.1 8.5 - 10.5 mg/dL    BUN 12 8 - 28 mg/dL    Creatinine 1.17 0.70 - 1.30 mg/dL    GFR MDRD Af Amer >60 >60 mL/min/1.73m2    GFR MDRD Non Af Amer 59 (L) >60 mL/min/1.73m2       Ordered BMP, CBC, vitamin D, and vitamin B12 at this visit.    ASSESSMENT:      ICD-10-CM    1. Hypertension I10    2. Coronary artery disease due to lipid rich plaque I25.10     I25.83    3. Hypercholesteremia E78.00    4. 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 Chronic obstructive pulmonary disease with acute exacerbation (H) J44.1    6. Influenza A H1N1  infection J10.1    7. Atrial tachycardia, multifocal (H) I47.1    8. Physical deconditioning R53.81      Assessment/Plan:    COPD excerebration with Influenza A/H1N1 infection: received tamiflu in hospital, still has respiratory issues, continue nebs and prednisone burst x3d    A-fib/atrial tachycardia: continue diltiazem 120mg daily, rate controlled    BPH: continue tamsulosin 0.4mg daily, no change    Insomnia: continue melatonin 3mg at HS    Hyperlipidemia: continue atorvastatin 40mg at HS    Disposition: yet to be decided, waiting for official cognitive scores and therapy recommendation      Total 35 minutes of which 75% was spent counseling and coordination of care of the above plan.     Patient evaluated by Bishnu Alonso CNP in conjunction with Clara Sheikh CNP, who scribed note. Bishnu Alonso CNP, then edited note. Plan agreed upon by patient, float nurse practitioner and nurse practitioner.     Electronically signed by: Bishnu Alonso NP

## 2021-06-19 NOTE — LETTER
Letter by Estefanía Plaza MD at      Author: Estefanía Plaza MD Service: -- Author Type: --    Filed:  Encounter Date: 8/22/2019 Status: (Other)         Delta Barrera  [address]  [facility] [address]             August 22, 2019         Dear Mr. Barrera,    Below are the results from your recent visit:    Resulted Orders   Glycosylated Hemoglobin A1c   Result Value Ref Range    Hemoglobin A1c 8.0 (H) 3.5 - 6.0 %   Basic Metabolic Panel   Result Value Ref Range    Sodium 141 136 - 145 mmol/L    Potassium 4.7 3.5 - 5.0 mmol/L    Chloride 106 98 - 107 mmol/L    CO2 26 22 - 31 mmol/L    Anion Gap, Calculation 9 5 - 18 mmol/L    Glucose 195 (H) 70 - 125 mg/dL    Calcium 8.9 8.5 - 10.5 mg/dL    BUN 12 8 - 28 mg/dL    Creatinine 0.98 0.70 - 1.30 mg/dL    GFR MDRD Af Amer >60 >60 mL/min/1.73m2    GFR MDRD Non Af Amer >60 >60 mL/min/1.73m2    Narrative    Fasting Glucose reference range is 70-99 mg/dL per  American Diabetes Association (ADA) guidelines.      Mr. Barrera, your kidney function is stable.  Your diabetes number came back at 8% like we talked  about at the visit.     Please call with questions or contact us using [employer].    Sincerely,        Electronically signed by Estefanía Plaza MD

## 2021-06-20 NOTE — LETTER
Letter by Sarahi Arechiga MBBS at      Author: Sarahi Arechiga MBBS Service: -- Author Type: --    Filed:  Encounter Date: 1/14/2020 Status: Signed         Patient: Delta Barrera   MR Number: [id number]   YOB: 1934   Date of Visit: 1/14/2020       [facility] Admission note      Patient: Delta Barrera  MRN: [id number]  Date of Service: 1/14/2020      [facility] [[id number]]  Reason for Visit     Chief Complaint   Patient presents with   ? H & P       Code Status     DNR/ DNI    Assessment     -Left upper extremity cellulitis  -History of a mechanical fall  -Dementia with confusion noted and limited recall  -History of atrial fibrillation  - htn  - DM-2  -History of COPD emphysema  -Generalized weakness patient is wheelchair-bound    Plan     Patient admitted to the TCU for strengthening and rehab.  He will finish his oral antibiotics in the TCU.  Urged on Keflex for additional 10 days  Wound care orders rescinded as his wounds have healed in a very shallow and superficial  Due to high falls risk he has been taken off anticoagulation for atrial fibrillation.  He is on aspirin 81 mg daily  He will be participating in strengthening and gait stability training.  .  Monitor blood sugars he is no longer on oral meds.  His last A1c was 7.7.  He has been discharged with no blood sugar checks or insulin sliding scale.  To assess control in light of his elevated A1c we will order blood sugar checks twice daily for the next few days and monitor response  For atrial fibrillation monitor heart rates  He is no longer on any rate control medications.  He has history of BPH and is on Flomax and Proscar.  Monitor him for any voiding concerns  Cognitively he remains impaired and quite confused will await for assessment for him ot in the meantime he remains a fall risk and is being monitored more closely.  He is currently wheelchair-bound.  He has multiple eyedrops for glaucoma but no  diagnosis listed on questioning he is putting the drops in his right eye's orders will be placed for that.  Continue with his PT OT and rehab    History     Patient is a very pleasant 85 y.o. male who is admitted to TCU  He presented to the hospital after mechanical fall.  He was complaining of left-sided shoulder pain.  Imaging however was negative for any fracture or dislocation of his shoulder  He was noted to have increased erythema of his left upper extremity and was diagnosed with cellulitis he was treated with IV cefazolin in the hospital.  He has an underlying history of atrial fibrillation his heart rates were adequately controlled however he is no longer on anticoagulation any longer due to advanced age debilitation and falls risk.  He also has a history of COPD emphysema which was felt to be stable.  Patient has a history of diabetes his last A1c was 7.7 he is no longer on any medications and was covered with sliding scale insulin   NO blood sugars checks were ordered up in the TCU.  He has underlying history of significant dementia and alert and oriented only to self  Remains pleasantly confused with cooperative with care denies any concerns.  He however has limited recall and not sure why he is admitted to the TCU and where he is currently    Past Medical History     Active Ambulatory (Non-Hospital) Problems    Diagnosis   ? ACP (advance care planning)   ? Cellulitis   ? Cellulitis of left upper extremity   ? Dementia without behavioral disturbance (H)   ? Benign prostatic hyperplasia with urinary retention   ? Type 2 diabetes mellitus with hyperglycemia, without long-term current use of insulin (H)   ? Atrial tachycardia, multifocal (H)   ? Hypertension   ? Coronary artery disease due to lipid rich plaque   ? Hypercholesteremia   ? Aneurysm Of The Abdominal Aorta   ? COPD (chronic obstructive pulmonary disease) (H)     Past Medical History:   Diagnosis Date   ? COPD (chronic obstructive pulmonary  disease) (H)    ? Dementia (H)    ? Diverticulosis    ? Goals of care, counseling/discussion    ? Hypercholesteremia    ? Hypertension    ? Influenza A H1N1 infection 12/28/2018       Past Social History     Reviewed, and he  reports that he quit smoking about 16 years ago. He has a 90.00 pack-year smoking history. He has never used smokeless tobacco. He reports previous alcohol use. He reports previous drug use.    Family History     Reviewed, and includes a history of emphysema and COPD in both his parents who were smokers.  Several of his siblings also have COPD and have been smokers    Medication List   Post Discharge Medication Reconciliation Status: discharge medications reconciled and changed, per note/orders (see AVS)   Current Outpatient Medications on File Prior to Visit   Medication Sig Dispense Refill   ? aspirin 81 MG EC tablet Take 81 mg by mouth daily.     ? atorvastatin (LIPITOR) 40 MG tablet TAKE 1 TABLET BY MOUTH ONCE DAILY 90 tablet 3   ? betamethasone dipropionate (DIPROLENE) 0.05 % cream Apply topically 2 (two) times a day for 7 days. Apply to the red area of left forearm for a week 30 g 0   ? brimonidine (ALPHAGAN) 0.15 % ophthalmic solution Apply 1 drop to eye 2 (two) times a day.  11   ? cephalexin (KEFLEX) 500 MG capsule Take 1 capsule (500 mg total) by mouth 4 (four) times a day for 10 days. 40 capsule 0   ? dorzolamide-timolol (COSOPT) 22.3-6.8 mg/mL ophthalmic solution Administer 1 drop to both eyes 2 (two) times a day.     ? finasteride (PROSCAR) 5 mg tablet Take 5 mg by mouth daily.     ? latanoprost (XALATAN) 0.005 % ophthalmic solution Administer 1 drop to both eyes at bedtime.     ? tamsulosin (FLOMAX) 0.4 mg Cp24 TAKE ONE CAPSULE BY MOUTH ONCE DAILY 90 capsule 3     No current facility-administered medications on file prior to visit.        Allergies     No Known Allergies    Review of Systems   A comprehensive review of 14 systems was done. Pertinent findings noted here and in  history of present illness. All the rest negative.  Constitutional: Negative.  Negative for fever, chills, he has activity change, appetite change and fatigue.   HENT: Negative for congestion and facial swelling.    Eyes: Negative for photophobia, redness and visual disturbance.   Respiratory: Negative for cough and chest tightness.    Cardiovascular: Negative for chest pain, palpitations and leg swelling.   Gastrointestinal: Negative for nausea, diarrhea, constipation, blood in stool and abdominal distention.   Genitourinary: Negative.    Musculoskeletal: Negative.  Not sure why he cannot walk as per him  Denies any pain  Skin: Negative.  Right arm rash is improving  Neurological: Negative for dizziness, tremors, syncope, weakness, light-headedness and headaches.   Hematological: Does not bruise/bleed easily.   Psychiatric/Behavioral: Negative.  Confused with a limited recall feels he was dropped here by his wife for walking      Physical Exam     Recent Vitals 1/11/2020   Height -   Weight 200 lbs 10 oz   BSA (m2) 2.07 m2   /80   Pulse 76   Temp 97.2   Temp src -   SpO2 -   Some recent data might be hidden       Constitutional: Oriented to person,  and appears well-developed.  Patient is obese  HEENT:  Normocephalic and atraumatic.  Eyes: Conjunctivae and EOM are normal. Pupils are equal, round, and reactive to light. No discharge.  No scleral icterus. Nose normal. Mouth/Throat: Oropharynx is clear and moist. No oropharyngeal exudate.    NECK: Normal range of motion. Neck supple. No JVD present. No tracheal deviation present. No thyromegaly present.   CARDIOVASCULAR: Normal rate, regular rhythm and intact distal pulses.  Exam reveals no gallop and no friction rub.  Systolic murmur present.  PULMONARY: Effort normal and breath sounds normal. No respiratory distress.No Wheezing or rales.  ABDOMEN: Soft. Bowel sounds are normal. No distension and no mass.  There is no tenderness. There is no rebound and no  guarding. No HSM.  MUSCULOSKELETAL: Normal range of motion. No edema and no tenderness. Mild kyphosis, no tenderness.  LYMPH NODES: Has no cervical, supraclavicular, axillary and groin adenopathy.   NEUROLOGICAL: Alert and oriented to person, . No cranial nerve deficit.  Normal muscle tone. Coordination normal.   GENITOURINARY: Deferred exam.  SKIN: Skin is warm and dry. rash noted in left forearm which seems to be improving there are small areas of abrasions noted but no active drainage minimal erythema which is appears to be fading also;. No erythema. No pallor.   EXTREMITIES: No cyanosis, no clubbing, no edema. No Deformity.  PSYCHIATRIC: Normal mood, affect and behavior.  Recall is impaired judgment is impaired      Lab Results     Recent Results (from the past 240 hour(s))   ECG 12 lead nursing unit performed    Collection Time: 01/06/20  4:21 AM   Result Value Ref Range    SYSTOLIC BLOOD PRESSURE 169 mmHg    DIASTOLIC BLOOD PRESSURE 91 mmHg    VENTRICULAR RATE 94 BPM    ATRIAL RATE 100 BPM    P-R INTERVAL      QRS DURATION 128 ms    Q-T INTERVAL 376 ms    QTC CALCULATION (BEZET) 470 ms    P Axis      R AXIS 78 degrees    T AXIS -30 degrees    MUSE DIAGNOSIS       Atrial fibrillation  Right bundle branch block  T wave abnormality, consider inferior ischemia  Abnormal ECG  When compared with ECG of 28-DEC-2018 09:06,  No significant change was found  Confirmed by SEE ED PROVIDER NOTE FOR, ECG INTERPRETATION ([id number]),  INGRID LOPEZ ([id number]) on 1/6/2020 10:52:46 AM     Comprehensive metabolic panel    Collection Time: 01/06/20  4:36 AM   Result Value Ref Range    Sodium 141 136 - 145 mmol/L    Potassium 4.2 3.5 - 5.0 mmol/L    Chloride 107 98 - 107 mmol/L    CO2 23 22 - 31 mmol/L    Anion Gap, Calculation 11 5 - 18 mmol/L    Glucose 170 (H) 70 - 125 mg/dL    BUN 16 8 - 28 mg/dL    Creatinine 1.13 0.70 - 1.30 mg/dL    GFR MDRD Af Amer >60 >60 mL/min/1.73m2    GFR MDRD Non Af Amer >60 >60 mL/min/1.73m2     Bilirubin, Total 1.0 0.0 - 1.0 mg/dL    Calcium 9.1 8.5 - 10.5 mg/dL    Protein, Total 6.7 6.0 - 8.0 g/dL    Albumin 3.6 3.5 - 5.0 g/dL    Alkaline Phosphatase 94 45 - 120 U/L    AST 12 0 - 40 U/L    ALT 10 0 - 45 U/L   Magnesium    Collection Time: 01/06/20  4:36 AM   Result Value Ref Range    Magnesium 2.0 1.8 - 2.6 mg/dL   Protime-INR    Collection Time: 01/06/20  4:36 AM   Result Value Ref Range    INR 0.98 0.90 - 1.10   Lactic Acid    Collection Time: 01/06/20  4:36 AM   Result Value Ref Range    Lactic Acid 1.7 0.5 - 2.2 mmol/L   C-Reactive Protein    Collection Time: 01/06/20  4:36 AM   Result Value Ref Range    CRP 1.5 (H) 0.0 - 0.8 mg/dL   Blood culture from PERIPHERAL SITE    Collection Time: 01/06/20  4:36 AM   Result Value Ref Range    Anaerobic Blood Culture Bottle No Growth No Growth, No organisms seen, bottle returned to instrument, Specimen not received, No Growth at 24 hours, No Growth at 48 hours, No Growth at 72 hours, No Growth at 96 hours, No Growth at 120 hours    Aerobic Blood Culture Bottle No Growth No Growth, No organisms seen, bottle returned to instrument, Specimen not received, No Growth at 24 hours, No Growth at 120 hours, No Growth at 48 hours, No Growth at 72 hours, No Growth at 96 hours   Troponin I    Collection Time: 01/06/20  4:36 AM   Result Value Ref Range    Troponin I 0.01 0.00 - 0.29 ng/mL   HM1 (CBC with Diff)    Collection Time: 01/06/20  4:36 AM   Result Value Ref Range    WBC 9.2 4.0 - 11.0 thou/uL    RBC 5.25 4.40 - 6.20 mill/uL    Hemoglobin 15.9 14.0 - 18.0 g/dL    Hematocrit 48.1 40.0 - 54.0 %    MCV 92 80 - 100 fL    MCH 30.3 27.0 - 34.0 pg    MCHC 33.1 32.0 - 36.0 g/dL    RDW 14.3 11.0 - 14.5 %    Platelets 216 140 - 440 thou/uL    MPV 9.8 8.5 - 12.5 fL    Neutrophils % 70 50 - 70 %    Lymphocytes % 17 (L) 20 - 40 %    Monocytes % 9 2 - 10 %    Eosinophils % 3 0 - 6 %    Basophils % 0 0 - 2 %    Neutrophils Absolute 6.4 2.0 - 7.7 thou/uL    Lymphocytes Absolute 1.6  0.8 - 4.4 thou/uL    Monocytes Absolute 0.9 0.0 - 0.9 thou/uL    Eosinophils Absolute 0.3 0.0 - 0.4 thou/uL    Basophils Absolute 0.0 0.0 - 0.2 thou/uL   CK Total    Collection Time: 01/06/20  4:36 AM   Result Value Ref Range    CK, Total 48 30 - 190 U/L   Glycosylated Hemoglobin A1C    Collection Time: 01/06/20  4:36 AM   Result Value Ref Range    Hemoglobin A1c 7.8 (H) 4.2 - 6.1 %   POCT Glucose    Collection Time: 01/06/20  6:32 AM   Result Value Ref Range    Glucose 157 (H) 70 - 139 mg/dL   POCT Glucose    Collection Time: 01/06/20 11:40 AM   Result Value Ref Range    Glucose 189 (H) 70 - 139 mg/dL   POCT Glucose    Collection Time: 01/06/20  5:25 PM   Result Value Ref Range    Glucose 148 (H) 70 - 139 mg/dL   POCT Glucose    Collection Time: 01/06/20  9:18 PM   Result Value Ref Range    Glucose 158 (H) 70 - 139 mg/dL   Urinalysis-UC if Indicated    Collection Time: 01/06/20  9:20 PM   Result Value Ref Range    Color, UA Yellow Colorless, Yellow, Straw, Light Yellow    Clarity, UA Clear Clear    Glucose, UA 50 mg/dL (!) Negative    Bilirubin, UA Negative Negative    Ketones, UA Negative Negative    Specific Gravity, UA 1.025 1.001 - 1.030    Blood, UA Negative Negative    pH, UA 5.0 4.5 - 8.0    Protein, UA Negative Negative mg/dL    Urobilinogen, UA <2.0 E.U./dL <2.0 E.U./dL, 2.0 E.U./dL    Nitrite, UA Negative Negative    Leukocytes, UA Moderate (!) Negative    Bacteria, UA None Seen None Seen hpf    RBC, UA 0-2 None Seen, 0-2 hpf    WBC, UA 10-25 (!) None Seen, 0-5 hpf    Squam Epithel, UA 0-5 None Seen, 0-5 lpf    Mucus, UA Moderate (!) None Seen lpf    Ca Oxalate Jaqueline, UA Present (!) None Seen   Culture, Urine    Collection Time: 01/06/20  9:20 PM   Result Value Ref Range    Culture No Growth    Basic metabolic panel    Collection Time: 01/07/20  6:04 AM   Result Value Ref Range    Sodium 142 136 - 145 mmol/L    Potassium 4.2 3.5 - 5.0 mmol/L    Chloride 111 (H) 98 - 107 mmol/L    CO2 22 22 - 31 mmol/L     Anion Gap, Calculation 9 5 - 18 mmol/L    Glucose 155 (H) 70 - 125 mg/dL    Calcium 8.7 8.5 - 10.5 mg/dL    BUN 16 8 - 28 mg/dL    Creatinine 1.09 0.70 - 1.30 mg/dL    GFR MDRD Af Amer >60 >60 mL/min/1.73m2    GFR MDRD Non Af Amer >60 >60 mL/min/1.73m2   Glycosylated Hemoglobin A1C    Collection Time: 01/07/20  6:04 AM   Result Value Ref Range    Hemoglobin A1c 7.7 (H) 4.2 - 6.1 %   HM1 (CBC with Diff)    Collection Time: 01/07/20  6:05 AM   Result Value Ref Range    WBC 7.1 4.0 - 11.0 thou/uL    RBC 4.99 4.40 - 6.20 mill/uL    Hemoglobin 15.2 14.0 - 18.0 g/dL    Hematocrit 46.4 40.0 - 54.0 %    MCV 93 80 - 100 fL    MCH 30.5 27.0 - 34.0 pg    MCHC 32.8 32.0 - 36.0 g/dL    RDW 14.3 11.0 - 14.5 %    Platelets 184 140 - 440 thou/uL    MPV 9.7 8.5 - 12.5 fL    Neutrophils % 61 50 - 70 %    Lymphocytes % 23 20 - 40 %    Monocytes % 11 (H) 2 - 10 %    Eosinophils % 5 0 - 6 %    Basophils % 0 0 - 2 %    Neutrophils Absolute 4.3 2.0 - 7.7 thou/uL    Lymphocytes Absolute 1.6 0.8 - 4.4 thou/uL    Monocytes Absolute 0.8 0.0 - 0.9 thou/uL    Eosinophils Absolute 0.4 0.0 - 0.4 thou/uL    Basophils Absolute 0.0 0.0 - 0.2 thou/uL   POCT Glucose    Collection Time: 01/07/20  6:22 AM   Result Value Ref Range    Glucose 160 (H) 70 - 139 mg/dL   POCT Glucose    Collection Time: 01/07/20 12:47 PM   Result Value Ref Range    Glucose 128 70 - 139 mg/dL   POCT Glucose    Collection Time: 01/07/20  5:31 PM   Result Value Ref Range    Glucose 154 (H) 70 - 139 mg/dL   POCT Glucose    Collection Time: 01/07/20  9:34 PM   Result Value Ref Range    Glucose 176 (H) 70 - 139 mg/dL   POCT Glucose    Collection Time: 01/08/20  6:28 AM   Result Value Ref Range    Glucose 132 70 - 139 mg/dL   POCT Glucose    Collection Time: 01/08/20 11:58 AM   Result Value Ref Range    Glucose 171 (H) 70 - 139 mg/dL   POCT Glucose    Collection Time: 01/08/20  4:29 PM   Result Value Ref Range    Glucose 120 70 - 139 mg/dL   POCT Glucose    Collection Time: 01/08/20   8:32 PM   Result Value Ref Range    Glucose 158 (H) 70 - 139 mg/dL   POCT Glucose    Collection Time: 01/09/20  6:16 AM   Result Value Ref Range    Glucose 126 70 - 139 mg/dL   POCT Glucose    Collection Time: 01/09/20 11:10 AM   Result Value Ref Range    Glucose 163 (H) 70 - 139 mg/dL   Basic Metabolic Panel    Collection Time: 01/13/20  6:00 AM   Result Value Ref Range    Sodium 143 136 - 145 mmol/L    Potassium 3.8 3.5 - 5.0 mmol/L    Chloride 110 (H) 98 - 107 mmol/L    CO2 26 22 - 31 mmol/L    Anion Gap, Calculation 7 5 - 18 mmol/L    Glucose 144 (H) 70 - 125 mg/dL    Calcium 8.6 8.5 - 10.5 mg/dL    BUN 11 8 - 28 mg/dL    Creatinine 0.88 0.70 - 1.30 mg/dL    GFR MDRD Af Amer >60 >60 mL/min/1.73m2    GFR MDRD Non Af Amer >60 >60 mL/min/1.73m2   HM2(CBC w/o Differential)    Collection Time: 01/13/20  6:00 AM   Result Value Ref Range    WBC 7.8 4.0 - 11.0 thou/uL    RBC 4.74 4.40 - 6.20 mill/uL    Hemoglobin 14.2 14.0 - 18.0 g/dL    Hematocrit 43.7 40.0 - 54.0 %    MCV 92 80 - 100 fL    MCH 30.0 27.0 - 34.0 pg    MCHC 32.5 32.0 - 36.0 g/dL    RDW 14.1 11.0 - 14.5 %    Platelets 197 140 - 440 thou/uL    MPV 10.3 8.5 - 12.5 fL   Magnesium    Collection Time: 01/13/20  6:00 AM   Result Value Ref Range    Magnesium 1.9 1.8 - 2.6 mg/dL            Imaging Results     Xr Humerus Left 2 Vws    Result Date: 1/6/2020  EXAM: XR FOREARM LEFT, XR HUMERUS LEFT 2 VWS LOCATION: [facility] DATE/TIME: 1/6/2020 5:13 AM INDICATION: Trauma; infection. COMPARISON: None.     No fracture or dislocation. Arthritis in the shoulder joint. Multiple vascular calcifications. No soft tissue gas.     Xr Forearm Left    Result Date: 1/6/2020  EXAM: XR FOREARM LEFT, XR HUMERUS LEFT 2 VWS LOCATION: [facility] DATE/TIME: 1/6/2020 5:13 AM INDICATION: Trauma; infection. COMPARISON: None.     No fracture or dislocation. Arthritis in the shoulder joint. Multiple vascular calcifications. No soft tissue gas.             CALLI Wynn

## 2021-06-20 NOTE — LETTER
"Letter by Isabela Woods CNP at      Author: Isabela Woods CNP Service: -- Author Type: --    Filed:  Encounter Date: 2020 Status: Signed         Patient: Delta Barrera   MR Number: [id number]   YOB: 1934   Date of Visit: 2020       [facility]      NAME:  Delta Barrera             :  1934    MRN: [id number]    CODE STATUS:  FULL CODE    FACILITY: [facility] [[id number]]         CHIEF COMPLAIN/REASON FOR VISIT:  Chief Complaint   Patient presents with   ? Review Of Multiple Medical Conditions       HISTORY OF PRESENT ILLNESS: Delta Barrera is a 85 y.o. male being seen today for review of multiple medical conditions. He comes from the [facility] after a stay from  to  where he presented due to a cellulitis to left arm.  Per EMR \"85 y old male with history of dementia, atrial fibrillation, emphysema, type 2 diabetes, hypertension, coronary artery disease, hyperlipidemia and BPH presented with progressive erythema of left upper extremity, started after the injury to his left elbow following a mechanical fall few days before arrival.  He was diagnosed with cellulitis, started on cefazolin. Responded well, he was eventually discharged with Keflex. Betamethasone topical added to her regimen for dermatitis and itchy skin of his forearm \". Although he has dementia he is awake and alert, able to answer most questions. Asked where he lived before hospitalization, thought he lived in [facility] but wasn't sure. He does know he is at [place].Wife here today and we discussed dc plans. She does have plans of pt going to LTC upon dc. She has been working with the  for placement.       No Known Allergies:     Current Outpatient Medications   Medication Sig   ? aspirin 81 MG EC tablet Take 81 mg by mouth daily.   ? atorvastatin (LIPITOR) 40 MG tablet TAKE 1 TABLET BY MOUTH ONCE DAILY   ? betamethasone dipropionate (DIPROLENE) 0.05 % " cream Apply topically 2 (two) times a day for 7 days. Apply to the red area of left forearm for a week   ? brimonidine (ALPHAGAN) 0.15 % ophthalmic solution Apply 1 drop to eye 2 (two) times a day.   ? cephalexin (KEFLEX) 500 MG capsule Take 1 capsule (500 mg total) by mouth 4 (four) times a day for 10 days.   ? dorzolamide-timolol (COSOPT) 22.3-6.8 mg/mL ophthalmic solution Administer 1 drop to both eyes 2 (two) times a day.   ? finasteride (PROSCAR) 5 mg tablet Take 5 mg by mouth daily.   ? latanoprost (XALATAN) 0.005 % ophthalmic solution Administer 1 drop to both eyes at bedtime.   ? tamsulosin (FLOMAX) 0.4 mg Cp24 TAKE ONE CAPSULE BY MOUTH ONCE DAILY         REVIEW OF SYSTEMS:    Poor historian due to dementia, not sure when asked many ROS.       PHYSICAL EXAMINATION:  Vitals:    01/16/20 1650   BP: 130/74   Pulse: 66   Temp: 98.6  F (37  C)   Weight: 196 lb 4.8 oz (89 kg)         GENERAL: Awake, Alert, oriented, not in any form of acute distress, answers questions appropriately, follows simple commands, conversant  HEENT: Head is normocephalic with normal hair distribution. No evidence of trauma. Ears: No acute purulent discharge. Eyes: Conjunctivae pink with no scleral jaundice. Nose: Normal mucosa and septum. NECK: Supple with no cervical or supraclavicular lymphadenopathy. Trachea is midline.   CHEST: No tenderness or deformity, no crepitus  LUNG: Clear to auscultation with good chest expansion. There are no crackles or wheezes, normal AP diameter.  BACK: No kyphosis of the thoracic spine. Symmetric, no curvature, ROM normal, no CVA tenderness, no spinal tenderness   CVS: There is good S1  S2, there are no murmurs, rubs, gallops, or heaves, rhythm is regular.  ABDOMEN: Globular and soft, nontender to palpation, non distended, no masses, no organomegaly, good bowel sounds, no rebound or guarding, no peritoneal signs.   EXTREMITIES: Atraumatic. Full range of motion on both upper and lower extremities, has   pedal edema, no cyanosis or clubbing, no calf tenderness, normal cap refill, no joint swelling.Left arm in compression sleeve, some edema.  SKIN: Warm and dry, no erythema noted, no rashes or lesions.  NEUROLOGICAL: The patient is oriented to person, place. Strength and sensation are grossly intact. Face is symmetric.                    LABS:    Lab Results   Component Value Date    WBC 7.8 01/13/2020    HGB 14.2 01/13/2020    HCT 43.7 01/13/2020    MCV 92 01/13/2020     01/13/2020       Results for orders placed or performed in visit on 01/13/20   Basic Metabolic Panel   Result Value Ref Range    Sodium 143 136 - 145 mmol/L    Potassium 3.8 3.5 - 5.0 mmol/L    Chloride 110 (H) 98 - 107 mmol/L    CO2 26 22 - 31 mmol/L    Anion Gap, Calculation 7 5 - 18 mmol/L    Glucose 144 (H) 70 - 125 mg/dL    Calcium 8.6 8.5 - 10.5 mg/dL    BUN 11 8 - 28 mg/dL    Creatinine 0.88 0.70 - 1.30 mg/dL    GFR MDRD Af Amer >60 >60 mL/min/1.73m2    GFR MDRD Non Af Amer >60 >60 mL/min/1.73m2           Lab Results   Component Value Date    HGBA1C 7.7 (H) 01/07/2020     Vitamin D, Total (25-Hydroxy)   Date Value Ref Range Status   01/07/2019 9.2 (L) 30.0 - 80.0 ng/mL Final     Lab Results   Component Value Date    [id number] 630 01/07/2019       ASSESSMENT/PLAN:  1. Dementia without behavioral disturbance, unspecified dementia type (H)    2. Cellulitis of left upper extremity      1. Dementia: We will have OT review ongoing cognition testing during TCU stay. Staff to assist with ADLS and mange safety.Wife looking at LTC when dced.    2. Cellulitis: Left  arm with compression sleeve, Continue Keflex 500 two times a day X 10 days until completed. He is in a deconditioned state and to participate in rehab on the TCU with SN for chronic medical condition management. Pain stable. Arms with scabbed area over cellulitis.      Electronically signed by:  Isabela Woods CNP  This progress note was completed using Dragon software and there may  be grammatical errors.

## 2021-06-20 NOTE — LETTER
"Letter by Isabela Woods CNP at      Author: Isabela Woods CNP Service: -- Author Type: --    Filed:  Encounter Date: 1/10/2020 Status: Signed         Patient: Delta Barrera   MR Number: [id number]   YOB: 1934   Date of Visit: 1/10/2020       [facility]      NAME:  Delta Barrera             :  1934    MRN: [id number]    CODE STATUS:  FULL CODE    FACILITY: [facility] [[id number]]         CHIEF COMPLAIN/REASON FOR VISIT:  Chief Complaint   Patient presents with   ? Review Of Multiple Medical Conditions       HISTORY OF PRESENT ILLNESS: Delta Barrera is a 85 y.o. male being seen today for review of multiple medical conditions. He comes from [facility] after a stay from  to  where he presented due to a cellulitis to left arm.  Per EMR \"85 y old male with history of dementia, atrial fibrillation, emphysema, type 2 diabetes, hypertension, coronary artery disease, hyperlipidemia and BPH presented with progressive erythema of left upper extremity, started after the injury to his left elbow following a mechanical fall few days before arrival.  He was diagnosed with cellulitis, started on cefazolin. Responded well, he was eventually discharged with Keflex. Betamethasone topical added to her regimen for dermatitis and itchy skin of his forearm \". Although he has dementia he is awake and alert, able to answer most questions. Asked where he lived before hospitalization, thought he lived in [facility] but wasn't sure. [facility] duty and I did remove his edema sleeve to left arm, to inspect site. Has several scabbed over non draining skin abrasion to elbow area. His arm has warm erythema and some swelling present.  We reviewed med list, TCU routines and advance care planning this am.       No Known Allergies:     Current Outpatient Medications   Medication Sig   ? aspirin 81 MG EC tablet Take 81 mg by mouth " daily.   ? atorvastatin (LIPITOR) 40 MG tablet TAKE 1 TABLET BY MOUTH ONCE DAILY   ? betamethasone dipropionate (DIPROLENE) 0.05 % cream Apply topically 2 (two) times a day for 7 days. Apply to the red area of left forearm for a week   ? brimonidine (ALPHAGAN) 0.15 % ophthalmic solution Apply 1 drop to eye 2 (two) times a day.   ? cephalexin (KEFLEX) 500 MG capsule Take 1 capsule (500 mg total) by mouth 4 (four) times a day for 10 days.   ? dorzolamide-timolol (COSOPT) 22.3-6.8 mg/mL ophthalmic solution Administer 1 drop to both eyes 2 (two) times a day.   ? finasteride (PROSCAR) 5 mg tablet Take 5 mg by mouth daily.   ? latanoprost (XALATAN) 0.005 % ophthalmic solution Administer 1 drop to both eyes at bedtime.   ? tamsulosin (FLOMAX) 0.4 mg Cp24 TAKE ONE CAPSULE BY MOUTH ONCE DAILY         REVIEW OF SYSTEMS:    Poor historian due to dementia, not sure when asked many ROS.       PHYSICAL EXAMINATION:  Vitals:    01/11/20 0653   BP: 133/80   Pulse: 76   Temp: 97.2  F (36.2  C)   Weight: 200 lb 9.6 oz (91 kg)         GENERAL: Awake, Alert, oriented, not in any form of acute distress, answers questions appropriately, follows simple commands, conversant  HEENT: Head is normocephalic with normal hair distribution. No evidence of trauma. Ears: No acute purulent discharge. Eyes: Conjunctivae pink with no scleral jaundice. Nose: Normal mucosa and septum. NECK: Supple with no cervical or supraclavicular lymphadenopathy. Trachea is midline.   CHEST: No tenderness or deformity, no crepitus  LUNG: Clear to auscultation with good chest expansion. There are no crackles or wheezes, normal AP diameter.  BACK: No kyphosis of the thoracic spine. Symmetric, no curvature, ROM normal, no CVA tenderness, no spinal tenderness   CVS: There is good S1  S2, there are no murmurs, rubs, gallops, or heaves, rhythm is regular.  ABDOMEN: Globular and soft, nontender to palpation, non distended, no masses, no organomegaly, good bowel sounds, no  rebound or guarding, no peritoneal signs.   EXTREMITIES: Atraumatic. Full range of motion on both upper and lower extremities, has  pedal edema, no cyanosis or clubbing, no calf tenderness, normal cap refill, no joint swelling.Left arm in compression sleeve, some edema.  SKIN: Warm and dry, no erythema noted, no rashes or lesions.  NEUROLOGICAL: The patient is oriented to person, place. Strength and sensation are grossly intact. Face is symmetric.                    LABS:    Lab Results   Component Value Date    WBC 7.1 01/07/2020    HGB 15.2 01/07/2020    HCT 46.4 01/07/2020    MCV 93 01/07/2020     01/07/2020       Results for orders placed or performed during the hospital encounter of 01/06/20   Basic metabolic panel   Result Value Ref Range    Sodium 142 136 - 145 mmol/L    Potassium 4.2 3.5 - 5.0 mmol/L    Chloride 111 (H) 98 - 107 mmol/L    CO2 22 22 - 31 mmol/L    Anion Gap, Calculation 9 5 - 18 mmol/L    Glucose 155 (H) 70 - 125 mg/dL    Calcium 8.7 8.5 - 10.5 mg/dL    BUN 16 8 - 28 mg/dL    Creatinine 1.09 0.70 - 1.30 mg/dL    GFR MDRD Af Amer >60 >60 mL/min/1.73m2    GFR MDRD Non Af Amer >60 >60 mL/min/1.73m2           Lab Results   Component Value Date    HGBA1C 7.7 (H) 01/07/2020     Vitamin D, Total (25-Hydroxy)   Date Value Ref Range Status   01/07/2019 9.2 (L) 30.0 - 80.0 ng/mL Final     Lab Results   Component Value Date    TRXZJGZC02 630 01/07/2019       ASSESSMENT/PLAN:  1. Cellulitis of left upper extremity    2. Dementia without behavioral disturbance, unspecified dementia type (H)    3. ACP (advance care planning)      1. Cellulitis: Left  arm with compression sleeve, Continue Keflex 500 two times a day X 10 days until completed. He is in a deconditioned state and to participate in rehab on the TCU with SN for chronic medical condition management.     2. Dementia: We will have OT review ongoing cognition testing during TCU stay. Staff to assist with ADLS and mange safety.    3. ACP:  Greater then 16 minutes spent face to face reviewing advanced care planning. And signing POLST as Full code..      Electronically signed by:  Isabela Woods CNP  This progress note was completed using Dragon software and there may be grammatical errors.

## 2021-06-20 NOTE — LETTER
"Letter by Isabela Woods CNP at      Author: Isabela Woods CNP Service: -- Author Type: --    Filed:  Encounter Date: 2020 Status: Signed         Patient: Delta Barrera   MR Number: [id number]   YOB: 1934   Date of Visit: 2020       [facility]      NAME:  Delta Barrera             :  1934    MRN: [id number]    CODE STATUS:  FULL CODE    [facility]AC[facility]: [facility] [[id number]]         CHIEF COMPLAIN/REASON FOR VISIT:  Chief Complaint   Patient presents with   ? Review Of Multiple Medical Conditions       HISTORY OF PRESENT ILLNESS: Delta Barrera is a 85 y.o. male being seen today for review of multiple medical conditions. He comes from [facility] after a stay from  to  where he presented due to a cellulitis to left arm.  Per EMR \"85 y old male with history of dementia, atrial fibrillation, emphysema, type 2 diabetes, hypertension, coronary artery disease, hyperlipidemia and BPH presented with progressive erythema of left upper extremity, started after the injury to his left elbow following a mechanical fall few days before arrival.  He was diagnosed with cellulitis, started on cefazolin. Responded well, he was eventually discharged with Keflex. Betamethasone topical added to her regimen for dermatitis and itchy skin of his forearm \". Although he has dementia he is awake and alert, able to answer most questions. Asked where he lived before hospitalization, thought he lived in [facility] but wasn't sure. He does know he is at [place].. Nurses with concerns BP has been trending up.  No Known Allergies:     Current Outpatient Medications   Medication Sig   ? metoprolol succinate (TOPROL-XL) 25 MG Take 12.5 mg by mouth daily.   ? aspirin 81 MG EC tablet Take 81 mg by mouth daily.   ? atorvastatin (LIPITOR) 40 MG tablet TAKE 1 TABLET BY MOUTH ONCE DAILY   ? brimonidine (ALPHAGAN) 0.15 % ophthalmic solution Apply 1 drop to eye " 2 (two) times a day.   ? dorzolamide-timolol (COSOPT) 22.3-6.8 mg/mL ophthalmic solution Administer 1 drop to both eyes 2 (two) times a day.   ? finasteride (PROSCAR) 5 mg tablet Take 5 mg by mouth daily.   ? latanoprost (XALATAN) 0.005 % ophthalmic solution Administer 1 drop to both eyes at bedtime.   ? tamsulosin (FLOMAX) 0.4 mg Cp24 TAKE ONE CAPSULE BY MOUTH ONCE DAILY         REVIEW OF SYSTEMS:    Poor historian due to dementia, not sure when asked many ROS.       PHYSICAL EXAMINATION:  Vitals:    01/20/20 1402   BP: (!) 144/96   Pulse: 73   Temp: (!) 96.1  F (35.6  C)   Weight: 196 lb 6.4 oz (89.1 kg)         GENERAL: Awake, Alert, oriented, not in any form of acute distress, answers questions appropriately, follows simple commands, conversant  HEENT: Head is normocephalic with normal hair distribution. No evidence of trauma. Ears: No acute purulent discharge. Eyes: Conjunctivae pink with no scleral jaundice. Nose: Normal mucosa and septum. NECK: Supple with no cervical or supraclavicular lymphadenopathy. Trachea is midline.   CHEST: No tenderness or deformity, no crepitus  LUNG: Clear to auscultation with good chest expansion. There are no crackles or wheezes, normal AP diameter.  BACK: No kyphosis of the thoracic spine. Symmetric, no curvature, ROM normal, no CVA tenderness, no spinal tenderness   CVS: There is good S1  S2, there are no murmurs, rubs, gallops, or heaves, rhythm is regular.  ABDOMEN: Globular and soft, nontender to palpation, non distended, no masses, no organomegaly, good bowel sounds, no rebound or guarding, no peritoneal signs.   EXTREMITIES: Atraumatic. Full range of motion on both upper and lower extremities, has  pedal edema, no cyanosis or clubbing, no calf tenderness, normal cap refill, no joint swelling.Left arm in compression sleeve, some edema.  SKIN: Warm and dry, no erythema noted, no rashes or lesions.  NEUROLOGICAL: The patient is oriented to person, place. Strength and  sensation are grossly intact. Face is symmetric.      LABS:    Lab Results   Component Value Date    WBC 7.8 01/13/2020    HGB 14.2 01/13/2020    HCT 43.7 01/13/2020    MCV 92 01/13/2020     01/13/2020       Results for orders placed or performed in visit on 01/13/20   Basic Metabolic Panel   Result Value Ref Range    Sodium 143 136 - 145 mmol/L    Potassium 3.8 3.5 - 5.0 mmol/L    Chloride 110 (H) 98 - 107 mmol/L    CO2 26 22 - 31 mmol/L    Anion Gap, Calculation 7 5 - 18 mmol/L    Glucose 144 (H) 70 - 125 mg/dL    Calcium 8.6 8.5 - 10.5 mg/dL    BUN 11 8 - 28 mg/dL    Creatinine 0.88 0.70 - 1.30 mg/dL    GFR MDRD Af Amer >60 >60 mL/min/1.73m2    GFR MDRD Non Af Amer >60 >60 mL/min/1.73m2           Lab Results   Component Value Date    HGBA1C 7.7 (H) 01/07/2020     Vitamin D, Total (25-Hydroxy)   Date Value Ref Range Status   01/07/2019 9.2 (L) 30.0 - 80.0 ng/mL Final     Lab Results   Component Value Date    [id number] 630 01/07/2019       ASSESSMENT/PLAN:  1. Dementia without behavioral disturbance, unspecified dementia type (H)    2. Cellulitis of left upper extremity    3. Hypertension      1. Dementia: We will have OT review ongoing cognition testing during TCU stay. Staff to assist with ADLS and mange safety.Wife looking at LTC when dced.    2. Cellulitis: Left  arm with compression sleeve, Continue Keflex 500 two times a day X 10 days until completed. He is in a deconditioned state and to participate in rehab on the TCU with SN for chronic medical condition management. Pain stable. Arms with scabbed area over cellulitis.    3. HTN: BP has been trending high, this am at 144/96, over week end noted to have dystalic at 105. Start 12.5 mg po daily and we will trend. BMP in am.      Electronically signed by:  Isabela Woods CNP  This progress note was completed using Dragon software and there may be grammatical errors.

## 2021-06-20 NOTE — LETTER
"Letter by Isabela Woods CNP at      Author: Isabela Woods CNP Service: -- Author Type: --    Filed:  Encounter Date: 2020 Status: (Other)         Patient: Delta Barrera   MR Number: [id number]   YOB: 1934   Date of Visit: 2020     [facility]      NAME:  Delta Barrera             :  1934  MRN: [id number]  CODE STATUS:  FULL CODE    VISIT TYPE: DISCHARGE SUMMARY  FACILYTY: [facility] [[id number]]    HOSPITALIZATION: [facility]   TO 2020                  PRIMARY CARE PROVIDER: Estefanía Plaza MD    DISCHARGE DIAGNOSIS:      1. Dementia without behavioral disturbance, unspecified dementia type (H)    2. Cellulitis of left upper extremity         DISCHARGE MEDICATIONS:         Medication List          Accurate as of 2020 11:34 AM. If you have any questions, ask your nurse or doctor.            CONTINUE taking these medications    aspirin 81 MG EC tablet     atorvastatin 40 MG tablet  Commonly known as:  LIPITOR  TAKE 1 TABLET BY MOUTH ONCE DAILY     brimonidine 0.15 % ophthalmic solution  Commonly known as:  ALPHAGAN     dorzolamide-timolol 22.3-6.8 mg/mL ophthalmic solution  Commonly known as:  COSOPT     finasteride 5 mg tablet  Commonly known as:  PROSCAR     latanoprost 0.005 % ophthalmic solution  Commonly known as:  XALATAN     metoprolol succinate 25 MG  Commonly known as:  TOPROL-XL     tamsulosin 0.4 mg Cap  Commonly known as:  FLOMAX  TAKE ONE CAPSULE BY MOUTH ONCE DAILY            HISTORY OF PRESENT ILLNESS: Delta Barrera is a 85 y.o. male is being seen today for a face to face visit for an anticipated dc to prede AL on 20.He comes from the [facility] after a stay from  to  where he presented due to a cellulitis to left arm.  Per EMR \"85 y old male with history of dementia, atrial fibrillation, emphysema, type 2 diabetes, hypertension, coronary artery disease, " "hyperlipidemia and BPH presented with progressive erythema of left upper extremity, started after the injury to his left elbow following a mechanical fall few days before arrival.  He was diagnosed with cellulitis, started on cefazolin. Responded well, he was eventually discharged with Keflex. Betamethasone topical added to her regimen for dermatitis and itchy skin of his forearm \". Although he has dementia he is awake and alert, able to answer most questions. He can ambulate with walker and SBA, using wc for long distance mobility.    SKILLED NURSING FACILITY COURSE:  During this TCU stay, patient completed all anticipated goals of therapy.      PHYSICAL EXAMINATION:    Vitals:    01/22/20 1130   BP: 130/78   Pulse: 78   Temp: 97.6  F (36.4  C)   Weight: 196 lb 3.2 oz (89 kg)         GENERAL: Awake, Alert, oriented , not in any form of acute distress, answers questions appropriately, follows simple commands, conversant  HEENT: Head is normocephalic with normal hair distribution. No evidence of trauma. Ears: No acute purulent discharge. Eyes: Conjunctivae pink with no scleral jaundice. Nose: Normal mucosa and septum. NECK: Supple with no cervical or supraclavicular lymphadenopathy. Trachea is midline.   CHEST: No tenderness or deformity, no crepitus  LUNG: Clear to auscultation with good chest expansion. There are no crackles or wheezes, normal AP diameter.  BACK: No kyphosis of the thoracic spine. Symmetric, no curvature, ROM normal, no CVA tenderness, no spinal tenderness   CVS: There is good S1  S2, rhythm is regular.  ABDOMEN: Globular and soft, nontender to palpation, non distended, no masses, no organomegaly, good bowel sounds, no rebound or guarding, no peritoneal signs.   EXTREMITIES: Atraumatic. Full range of motion on both upper and lower extremities, there is no tenderness to palpation, trace pedal edema, no cyanosis or clubbing, no calf tenderness, normal cap refill, no joint swelling.Adonis vick wears " compression, faded red  SKIN: Warm and dry, no erythema noted, no rashes or lesions.Scabbed areas to his left elbow  NEUROLOGICAL: The patient is oriented to person, place , cognition impairments  LABS:  All labs reviewed in the nursing home record.        DISCHARGE PLAN: I certify that this patient is under Dr. Arechiga's care, seen by the NP, and had a face-to-face encounter that meets the physician face-to-face encounter requirements on 1/22/20.  The encounter was in whole, or part related to the primary reason for home health.  The Patient is homebound due to: Deconditioned state, left arm cellulitis and  it is, taxing and it will take a considerable amount of effort for patient to leave the home.  He is dependent on others for transportation.  The patient is confined to his home and needs intermittent skilled nursing, PT,OT, RN, and HHA.  The patient has been under the care of Dr. Arechiga/NP and Dr. Arechiga  initiated the establishment of the plan of care.        Patient to be followed by home care for physical therapy to eval and treat for strengthening, balance, endurance, and safety with mobility, and ambulation.  Patient to be followed by home care for occupational therapy to eval and treat for strengthening, ADL needs, adaptive equipment, and safety.  Patient to be followed by home care for nursing services for medication set up and teaching, symptom and disease processes monitoring and education.    Patient to be followed by home care for home health aid services for bathing and ADL needs.  Planned discharge.  All therapy goals have been met.  Family will assist with discharge and transportation.    Post Discharge Medication Reconciliation Status: discharge medications reconciled and changed, per note/orders (see AVS)    Patient will follow up with PCP within 7- days after discharge for medication mangagment and appropriate lab studies.          Electronically signed by:  Isabela Woods CNP  This progress  note was completed using Dragon software and there may be grammatical errors.      For documentation purposes, chart review, medication management, and discharge coordination of care was greater than 35 minutes

## 2021-06-22 NOTE — PROGRESS NOTES
"[facility]      Facility:    [facility] [[id number]]  Code Status: DNR       Chief Complaint/Reason for Visit:  Chief Complaint   Patient presents with     H & P     New admit to TCU for sepsis, influenza A, COPD exac.        HPI:   Delta is a 84 y.o. male with hx of COPD, HTN, BPH, presented to the ED on 12/28/18 due to \"complaints of ongoing fever, confusion, increasing tiredness.  Patient was not able to do most of the history but patient's family members which include daughter and wife are able to provide most of the history.  Patient has refused CPAP and also oxygen and most of the time he sleeps because he is tired during the day.  Patient had [employer] celebration where he met with other family members who also however running very high fever and flulike symptoms.  Patient came into the emergency department was not hypoxic but has effort of breathing and also tachypneic and tachycardic.  Patient had new onset atrial fibrillation in the emergency department which got better after getting some Cardizem.  Patient had multiple doses of neb treatments in the emergency department to help with his breathing.  Patient comfortably resting during the time of examination and denies any palpitations or shortness of breath or any chest pain during the time of exam.  Patient was admitted to the ICU because of his lactate elevation to 3.3 along with acute respiratory distress and new onset atrial fibrillation\".    Hospital discharge summary as per below:     84-year-old male with history of dementia, COPD, chronic hypoxic respiratory failure (refused supplemental oxygen) presented to ER with fever and shortness of breath.  He had lactic acidosis on admission. He was diagnosed with sepsis and COPD exacerbation 2/2 influenza A. Resuscitated with IV fluid and started on Tamiflu, systemic steroid and nebbulizer. He had multifocal atrial tachycardia with rapid ventricular " response on admission.  Started on Cardizem.  Patient has history of dementia, was agitated in the floor, required one-to-one sitter. He eventually discharged to TCU in a stable. Hospice to follow the patient after discharge per family request.     Overall stabilized and discharged to TCU on 1/1/19 for PT, OT, nursing cares, medical management and monitoring.     Today:  He reports feeling weak but overall improved from previous. No fever. He denies shortness of breath at rest. Not using oxygen. Appetite is pretty good. No diarrhea or constipation. He is hopeful to return home to his wife soon. Working with therapy. No new vision or hearing problems      Past Medical History:  Past Medical History:   Diagnosis Date     COPD (chronic obstructive pulmonary disease) (H)     Created by Conversion      Dementia      Hypercholesteremia     Created by Conversion      Hypertension     Created by Conversion  Replacement Utility updated for latest IMO load           Surgical History:  Past Surgical History:   Procedure Laterality Date     CT HEMORRHOIDECTOMY INTERNAL RUBBER BAND LIGATIONS      Description: Hemorrhoidectomy;  Recorded: 05/29/2013;  Comments: September 2004- 2 quadrant  hemmoroidectomy and proctoplasty of the rectal mucosa prolapse.     CT REMOVAL OF TONSILS,<11 Y/O      Description: Tonsillectomy;  Recorded: 09/10/2012;  Comments: tosilectomy as a child     CT REMOVE BLADDER STONE,<2.5 CM      Description: Cystoscopy With Fragmentation Of Bladder Calculus;  Recorded: 03/01/2013;  Comments: May 2010 by Dr. Florez at [facility] ; ; November 20th 2012 by Dr. Garner At [facility]       Family History:   No family history on file.    Social History:    Social History     Socioeconomic History     Marital status:      Spouse name: Not on file     Number of children: Not on file     Years of education: Not on file     Highest education level: Not on file   Social Needs     Financial resource strain: Not on  file     Food insecurity - worry: Not on file     Food insecurity - inability: Not on file     Transportation needs - medical: Not on file     Transportation needs - non-medical: Not on file   Occupational History     Not on file   Tobacco Use     Smoking status: Former Smoker     Packs/day: 2.00     Years: 45.00     Pack years: 90.00     Last attempt to quit: 6/22/2003     Years since quitting: 15.5     Smokeless tobacco: Never Used   Substance and Sexual Activity     Alcohol use: Not on file     Drug use: Not on file     Sexual activity: Not on file   Other Topics Concern     Not on file   Social History Narrative     Not on file       Medications:  Current Outpatient Medications   Medication Sig     albuterol (PROVENTIL) 2.5 mg /3 mL (0.083 %) nebulizer solution Take 3 mL (2.5 mg total) by nebulization every 4 (four) hours as needed for shortness of breath.     albuterol (PROVENTIL) 2.5 mg /3 mL (0.083 %) nebulizer solution Take 2.5 mg by nebulization 2 (two) times a day.     aspirin 81 MG EC tablet Take 81 mg by mouth daily.     atorvastatin (LIPITOR) 40 MG tablet TAKE 1 TABLET BY MOUTH ONCE DAILY     diltiazem (DILACOR XR) 120 MG 24 hr capsule Take 1 capsule (120 mg total) by mouth daily.     dorzolamide-timolol (COSOPT) 22.3-6.8 mg/mL ophthalmic solution Administer 1 drop to both eyes 2 (two) times a day.     latanoprost (XALATAN) 0.005 % ophthalmic solution Administer 1 drop to both eyes at bedtime.     melatonin 3 mg Tab tablet Take 1 tablet (3 mg total) by mouth at bedtime.     predniSONE (DELTASONE) 20 MG tablet 20 mg for three days then continue by 10 mg for another 3 days.     tamsulosin (FLOMAX) 0.4 mg Cp24 TAKE ONE CAPSULE BY MOUTH ONCE DAILY        Allergies:  No Known Allergies      Review of Systems:  Pertinent items are noted in HPI.      Physical Exam:   General: Patient is alert elderly male, no distress.   Vitals: /68, Temp 97.6, Pulse 68, RR 16, O2 sat 94 % RA.  HEENT: Head is NCAT. Eyes  show no injection or icterus. Nares negative. Oropharynx well hydrated.  Neck: Supple. No tenderness or adenopathy. No JVD.  Lungs: Clear bilaterally. No wheezes.  Cardiovascular: Regular rate and rhythm, normal S1, S2.  Back: No spinal or CVA tenderness.  Abdomen: Soft, no tenderness on exam. Bowel sounds present. No guarding rebound or rigidity.  : Deferred.  Extremities: Mild LE edema is noted.  Musculoskeletal: Age related degen changes.  Skin: No rashes.  Psych: Mood appears good.      Labs:  Results for orders placed or performed during the hospital encounter of 12/28/18   Basic Metabolic Panel   Result Value Ref Range    Sodium 141 136 - 145 mmol/L    Potassium 4.0 3.5 - 5.0 mmol/L    Chloride 107 98 - 107 mmol/L    CO2 23 22 - 31 mmol/L    Anion Gap, Calculation 11 5 - 18 mmol/L    Glucose 210 (H) 70 - 125 mg/dL    Calcium 9.1 8.5 - 10.5 mg/dL    BUN 12 8 - 28 mg/dL    Creatinine 1.17 0.70 - 1.30 mg/dL    GFR MDRD Af Amer >60 >60 mL/min/1.73m2    GFR MDRD Non Af Amer 59 (L) >60 mL/min/1.73m2       Assessment/Plan:  1. Influenza A. Completed treatment with Tamiflu.  2. COPD. Exac secondary to influenza. Received steroids. Improved.  3. Multifocal atrial tach. Started on Cardizem. Monitor HR, BP.  4. HTN. BPs satisfactory.  5. HLD. Continue home atorvastatin.  6. Code status is DNR.        Total time greater than 50 minutes, greater than 50% counseling and coordination of care, time spent in interview and examination of patient, review of records, discussion with nursing staff. CC includes management of multiple medical conditions, discussion of respiratory status, expected course in TCU.         Electronically signed by: Eden Madrigal MD

## 2021-06-22 NOTE — PROGRESS NOTES
[facility]      Code Status:  FULL CODE  Visit Type: Review Of Multiple Medical Conditions     Facility:  [facility] [[id number]]           History of Present Illness: Delta Barrera is a 84 y.o. male who was admitted with acute influenza currently seems to have resolved denies cough congestion or any fever  He has COPD but seems to be at baseline no exacerbation noted he was given a burst of steroid in the hospital  Patient also has a history of multifocal atrial tachycardia with rapid ventricular response he was started on Cardizem.  Seems to be tolerating the medication well  He has significant dementia and remains confused he is alert and oriented to self.  No longer has any delirium though  Cognitive score initially is extremely impaired with a slums of 4/30  He has a history of hypertension and his blood pressures have been stable  Feels he is making good progress in therapy and wants to go home  Past Medical History:   Diagnosis Date     COPD (chronic obstructive pulmonary disease) (H)     Created by Conversion      Dementia      Hypercholesteremia     Created by Conversion      Hypertension     Created by Conversion  Replacement Utility updated for latest IMO load     Past Surgical History:   Procedure Laterality Date     VA HEMORRHOIDECTOMY INTERNAL RUBBER BAND LIGATIONS      Description: Hemorrhoidectomy;  Recorded: 05/29/2013;  Comments: September 2004- 2 quadrant  hemmoroidectomy and proctoplasty of the rectal mucosa prolapse.     VA REMOVAL OF TONSILS,<13 Y/O      Description: Tonsillectomy;  Recorded: 09/10/2012;  Comments: tosilectomy as a child     VA REMOVE BLADDER STONE,<2.5 CM      Description: Cystoscopy With Fragmentation Of Bladder Calculus;  Recorded: 03/01/2013;  Comments: May 2010 by Dr. Flroez at [facility] ; ; November 20th 2012 by Dr. Garner At [facility]     No family history on file.  Social History     Socioeconomic History      Marital status:      Spouse name: Not on file     Number of children: Not on file     Years of education: Not on file     Highest education level: Not on file   Social Needs     Financial resource strain: Not on file     Food insecurity - worry: Not on file     Food insecurity - inability: Not on file     Transportation needs - medical: Not on file     Transportation needs - non-medical: Not on file   Occupational History     Not on file   Tobacco Use     Smoking status: Former Smoker     Packs/day: 2.00     Years: 45.00     Pack years: 90.00     Last attempt to quit: 6/22/2003     Years since quitting: 15.5     Smokeless tobacco: Never Used   Substance and Sexual Activity     Alcohol use: Not on file     Drug use: Not on file     Sexual activity: Not on file   Other Topics Concern     Not on file   Social History Narrative     Not on file     Current Outpatient Medications   Medication Sig Dispense Refill     albuterol (PROVENTIL) 2.5 mg /3 mL (0.083 %) nebulizer solution Take 3 mL (2.5 mg total) by nebulization every 4 (four) hours as needed for shortness of breath.  0     albuterol (PROVENTIL) 2.5 mg /3 mL (0.083 %) nebulizer solution Take 2.5 mg by nebulization 2 (two) times a day.       aspirin 81 MG EC tablet Take 81 mg by mouth daily.       atorvastatin (LIPITOR) 40 MG tablet TAKE 1 TABLET BY MOUTH ONCE DAILY 90 tablet 3     diltiazem (DILACOR XR) 120 MG 24 hr capsule Take 1 capsule (120 mg total) by mouth daily.  0     dorzolamide-timolol (COSOPT) 22.3-6.8 mg/mL ophthalmic solution Administer 1 drop to both eyes 2 (two) times a day.       latanoprost (XALATAN) 0.005 % ophthalmic solution Administer 1 drop to both eyes at bedtime.       melatonin 3 mg Tab tablet Take 1 tablet (3 mg total) by mouth at bedtime.  0     predniSONE (DELTASONE) 20 MG tablet 20 mg for three days then continue by 10 mg for another 3 days.  0     tamsulosin (FLOMAX) 0.4 mg Cp24 TAKE ONE CAPSULE BY MOUTH ONCE DAILY 90 capsule 3      No current facility-administered medications for this visit.      No Known Allergies      Review of Systems:    Constitutional: Negative.  Negative for fever, chills, he has activity change, appetite change and fatigue.   HENT: Negative for congestion and facial swelling.    Eyes: Negative for photophobia, redness and visual disturbance.   Respiratory: Negative for cough and chest tightness.    Cardiovascular: Negative for chest pain, palpitations and leg swelling.   Gastrointestinal: Negative for nausea, diarrhea, constipation, blood in stool and abdominal distention.   Genitourinary: Negative.    Musculoskeletal: Negative.  Week using wheelchair  Skin: Negative.    Neurological: Negative for dizziness, tremors, syncope, weakness, light-headedness and headaches.   Hematological: Does not bruise/bleed easily.   Psychiatric/Behavioral: Negative.  Confused with limited recall      Physical Exam:   Weight is 201 pounds blood pressure 132/86 temp 98 pulse 80  GENERAL: no acute distress. Cooperative in conversation.   HEENT: pupils are equal, round and reactive. Oral mucosa is moist and intact.  RESP:Chest symmetric. Regular respiratory rate. No stridor.  CVS: S1S2  ABD: Nondistended, soft.  EXTREMITIES: No lower extremity edema.   NEURO: non focal. Alert and oriented x self, recall is impaired.   PSYCH: within normal limits. No depression or anxiety.  SKIN: warm dry intact     Labs:    Recent Results (from the past 240 hour(s))   POCT Glucose   Result Value Ref Range    Glucose,  mg/dL   POCT Glucose   Result Value Ref Range    Glucose,  mg/dL   POCT Glucose   Result Value Ref Range    Glucose,  mg/dL   Potassium - Next AM   Result Value Ref Range    Potassium 4.0 3.5 - 5.0 mmol/L   Creatinine   Result Value Ref Range    Creatinine 0.78 0.70 - 1.30 mg/dL    GFR MDRD Af Amer >60 >60 mL/min/1.73m2    GFR MDRD Non Af Amer >60 >60 mL/min/1.73m2   POCT Glucose   Result Value Ref Range    Glucose, POC  187 mg/dL   POCT Glucose   Result Value Ref Range    Glucose,  mg/dL   POCT Glucose   Result Value Ref Range    Glucose,  mg/dL   Blood Gases, Arterial   Result Value Ref Range    pH, Arterial 7.41 7.37 - 7.44    pCO2, Arterial 37 35 - 45 mm Hg    pO2, Arterial 57 (L) 75 - 85 mm Hg    Bicarbonate, Arterial Calc 24.1 23.0 - 29.0 mmol/L    O2 Sat, Arterial 93.6 (L) 95.0 - 96.0 %    Oxyhemoglobin 90.7 (L) 95.0 - 96.0 %    Base Excess, Arterial Calc -0.8 mmol/L    Ventilation Mode Room Air     FIO2      Sample Stabilized Temperature 37.0 degrees C   POCT Glucose   Result Value Ref Range    Glucose,  mg/dL   POCT Glucose   Result Value Ref Range    Glucose,  mg/dL   Potassium - Next AM   Result Value Ref Range    Potassium 3.9 3.5 - 5.0 mmol/L   Platelet Count - every other day x 3   Result Value Ref Range    Platelets 158 140 - 440 thou/uL   POCT Glucose   Result Value Ref Range    Glucose,  mg/dL   POCT Glucose   Result Value Ref Range    Glucose,  mg/dL   Basic Metabolic Panel   Result Value Ref Range    Sodium 140 136 - 145 mmol/L    Potassium 3.6 3.5 - 5.0 mmol/L    Chloride 105 98 - 107 mmol/L    CO2 24 22 - 31 mmol/L    Anion Gap, Calculation 11 5 - 18 mmol/L    Glucose 265 (H) 70 - 125 mg/dL    Calcium 8.3 (L) 8.5 - 10.5 mg/dL    BUN 18 8 - 28 mg/dL    Creatinine 0.84 0.70 - 1.30 mg/dL    GFR MDRD Af Amer >60 >60 mL/min/1.73m2    GFR MDRD Non Af Amer >60 >60 mL/min/1.73m2   Vitamin B12   Result Value Ref Range    Vitamin B-12 630 213 - 816 pg/mL   Vitamin D, Total (25-Hydroxy)   Result Value Ref Range    Vitamin D, Total (25-Hydroxy) 9.2 (L) 30.0 - 80.0 ng/mL   HM2(CBC w/o Differential)   Result Value Ref Range    WBC 13.1 (H) 4.0 - 11.0 thou/uL    RBC 5.09 4.40 - 6.20 mill/uL    Hemoglobin 15.4 14.0 - 18.0 g/dL    Hematocrit 46.8 40.0 - 54.0 %    MCV 92 80 - 100 fL    MCH 30.3 27.0 - 34.0 pg    MCHC 32.9 32.0 - 36.0 g/dL    RDW 14.4 11.0 - 14.5 %    Platelets 182 140 -  440 thou/uL    MPV 10.5 8.5 - 12.5 fL     Xr Chest 1 View Portable    Result Date: 12/28/2018  XR CHEST 1 VIEW PORTABLE 12/28/2018 10:08 AM INDICATION: Respiratory failure COMPARISON: None. FINDINGS: Cervicothoracic fusion hardware. Bilateral acromioclavicular arthropathy. Heart size appears normal. Lungs appear clear.          Assessment/Plan:    Acute influenza currently resolved.  COPD exacerbation currently resolving nicely.  Multifocal atrial tachycardia with rapid ventricular response.  History of dementia with delirium.  Debilitation.  Patient is currently afebrile influenza has resolved with no cough and congestion.  We will discontinue scheduled nebs to as needed.  Remains confused pleasantly but able to reorient himself..  Discharge plan is to go home with his wife  Due to extremely low vitamin D level he has been started on supplementation  BIMS7  No significant behavioral issues and he is sleeping through the night  Discharge plan is to go home with his wife as per patient and staff    Electronically signed by: CALLI Wynn  This progress note was completed using Dragon software and there may be grammatical errors.

## 2021-06-22 NOTE — PROGRESS NOTES
[facility]    Facility:   [facility] [[id number]]   Code Status: DNR      CHIEF COMPLAINT/REASON FOR VISIT:  Chief Complaint   Patient presents with     Review Of Multiple Medical Conditions       HISTORY:      HPI: Delta is a 84 y.o. male with recent history of hospitalization at [facility] from 12/28/18-1/1/19 for influenza A and COPD exacerbation.  The hospital summary is as follows:    84-year-old male with history of dementia, COPD, chronic hypoxic respiratory failure (refused supplemental oxygen) presented to ER with fever and shortness of breath.  He had lactic acidosis on admission. He was diagnosed with sepsis and COPD exacerbation 2/2 influenza A. Resuscitated with IV fluid and started on Tamiflu, systemic steroid and nebbulizer. He had multifocal atrial tachycardia with rapid ventricular response on admission.  Started on Cardizem.  Patient has history of dementia, was agitated in the floor, required one-to-one sitter.     He eventually discharged to TCU in a stable. Hospice to follow the patient after discharge per family request.    Mr. Barrera is being seen today for a review of multiple medical conditions.  He is a well-groomed, pleasantly confused gentleman.  He was oriented to person, disoriented to place, time, and situation at this visit.  Mr. Barrera lives with his wife at home and is planning on discharging from TCU with home hospice.  The patient denies any concerns at this time.   PT reported that patient has been progressing well with therapy for his BLE weakness.  The patient denied lightheadedness, dizziness, breathing difficulty, chest pain, palpitations, constipation, urinary symptoms, numbness or tingling in extremities, and pain.  No other issues reported.      Past Medical History:   Diagnosis Date     COPD (chronic obstructive pulmonary disease) (H)     Created by Conversion      Dementia      Hypercholesteremia      Created by Conversion      Hypertension     Created by Conversion  Replacement Utility updated for latest IMO load             No family history on file.  Social History     Socioeconomic History     Marital status:      Spouse name: Not on file     Number of children: Not on file     Years of education: Not on file     Highest education level: Not on file   Social Needs     Financial resource strain: Not on file     Food insecurity - worry: Not on file     Food insecurity - inability: Not on file     Transportation needs - medical: Not on file     Transportation needs - non-medical: Not on file   Occupational History     Not on file   Tobacco Use     Smoking status: Former Smoker     Packs/day: 2.00     Years: 45.00     Pack years: 90.00     Last attempt to quit: 6/22/2003     Years since quitting: 15.5     Smokeless tobacco: Never Used   Substance and Sexual Activity     Alcohol use: Not on file     Drug use: Not on file     Sexual activity: Not on file   Other Topics Concern     Not on file   Social History Narrative     Not on file       REVIEW OF SYSTEM:  Pertinent items are noted in HPI.  A 12 point comprehensive review of systems was negative except as noted.  ROS limited due to patient's cognitive impairment.    PHYSICAL EXAM:   /89   Pulse 78   Temp 97  F (36.1  C)   Resp 16   Wt 201 lb (91.2 kg)   SpO2 99%   BMI 31.48 kg/m      General Appearance:    Alert, cooperative, no distress, appears stated age   Head:    Normocephalic, without obvious abnormality, atraumatic   Eyes:    PERRL, conjunctiva/corneas clear, both eyes        Ears:    Normal external ear canals, both ears   Nose:   Nares normal, septum midline, mucosa normal, no drainage    or sinus tenderness   Throat:   Lips, mucosa, and tongue normal; teeth and gums normal   Neck:   Supple, symmetrical, trachea midline   Back:     Symmetric, no curvature, ROM normal   Lungs:     Expiratory wheezing in all lung fields, respirations  unlabored   Chest wall:    No tenderness or deformity   Heart:    Regular rate and rhythm, S1 and S2 normal, no murmur, rub   or gallop   Abdomen:     Soft, non-tender, bowel sounds active all four quadrants,     no masses, no organomegaly   Extremities:   Extremities normal, atraumatic, no cyanosis, mild nonpitting edema in BLE   Pulses:   2+ and symmetric all extremities   Skin:   Skin color, texture, turgor normal, no rashes or lesions   Lymph nodes:   Cervical, supraclavicular, and axillary nodes normal   Neurologic:   Bilateral lower extremity weakness         LABS:   Results for orders placed or performed in visit on 01/07/19   Basic Metabolic Panel   Result Value Ref Range    Sodium 140 136 - 145 mmol/L    Potassium 3.6 3.5 - 5.0 mmol/L    Chloride 105 98 - 107 mmol/L    CO2 24 22 - 31 mmol/L    Anion Gap, Calculation 11 5 - 18 mmol/L    Glucose 265 (H) 70 - 125 mg/dL    Calcium 8.3 (L) 8.5 - 10.5 mg/dL    BUN 18 8 - 28 mg/dL    Creatinine 0.84 0.70 - 1.30 mg/dL    GFR MDRD Af Amer >60 >60 mL/min/1.73m2    GFR MDRD Non Af Amer >60 >60 mL/min/1.73m2     Lab Results   Component Value Date    WBC 13.1 (H) 01/07/2019    HGB 15.4 01/07/2019    HCT 46.8 01/07/2019    MCV 92 01/07/2019     01/07/2019       Assessment/Plan:    COPD excerebration with Influenza A/H1N1 infection: received tamiflu in hospital, still has respiratory issues, continue nebs and prednisone burst x3d    Leukocytosis: last WBC 13.1, probably d/t steroid burst, afebrile    A-fib/atrial tachycardia: continue diltiazem 120mg daily, rate controlled, HR <80    BPH: continue tamsulosin 0.4mg daily, no change    Vit D def: last level 9.2, start D3 3000U daily    DM: last A1c 7.8, oral intake variable, monitor BS two times a day x5d    Insomnia: continue melatonin 3mg at HS    Hyperlipidemia: continue atorvastatin 40mg at HS    Disposition: yet to be decided, waiting for official cognitive scores and therapy recommendation      The care plan  has been reviewed and all orders signed. Changes to care plan, if any, as noted. Otherwise, continue care plan of care.  The total time spent with this patient was greater than 25 minutes, with greater than 50% spent in counseling and coordination of care that included review of care and communication with therapy.    Electronically signed by: Bishnu Alonso NP

## 2021-06-22 NOTE — PROGRESS NOTES
[facility]    Facility:   [facility] [[id number]]   Code Status: DNR      CHIEF COMPLAINT/REASON FOR VISIT:  Chief Complaint   Patient presents with     Review Of Multiple Medical Conditions     physical deconditioning, hypertension, hyperlipidemia, CAD, AAA, COPD, influenza A, and atrial tachycardia       HISTORY:      HPI: Delta is a 84 y.o. male with recent history of hospitalization at [facility] from 12/28/18-1/1/19 for influenza A and COPD exacerbation.  The hospital summary is as follows:    84-year-old male with history of dementia, COPD, chronic hypoxic respiratory failure (refused supplemental oxygen) presented to ER with fever and shortness of breath.  He had lactic acidosis on admission. He was diagnosed with sepsis and COPD exacerbation 2/2 influenza A. Resuscitated with IV fluid and started on Tamiflu, systemic steroid and nebbulizer. He had multifocal atrial tachycardia with rapid ventricular response on admission.  Started on Cardizem.  Patient has history of dementia, was agitated in the floor, required one-to-one sitter.     He eventually discharged to TCU in a stable. Hospice to follow the patient after discharge per family request.    Mr. Barrera is being seen today for a review of multiple medical conditions.  He is a well-groomed, pleasantly confused gentleman.  He was oriented to person, disoriented to place, time, and situation at this visit.  Mr. Barrera lives with his wife at home and is planning on discharging from TCU with home hospice.  The patient denies any concerns at this time.  He feels that his breathing has improved since he was really sick when he first arrived at the hospital which is where he still believes that he is receiving care at this time.  PT reported that patient has been progressing well with therapy for his BLE weakness.  The patient denied lightheadedness, dizziness, breathing difficulty, chest pain,  palpitations, constipation, urinary symptoms, numbness or tingling in extremities, and pain.  The Nursing staff do not have any concerns for the patient at this time.      Past Medical History:   Diagnosis Date     COPD (chronic obstructive pulmonary disease) (H)     Created by Conversion      Dementia      Hypercholesteremia     Created by Conversion      Hypertension     Created by Conversion  Replacement Utility updated for latest IMO load             History reviewed. No pertinent family history.  Social History     Socioeconomic History     Marital status:      Spouse name: None     Number of children: None     Years of education: None     Highest education level: None   Social Needs     Financial resource strain: None     Food insecurity - worry: None     Food insecurity - inability: None     Transportation needs - medical: None     Transportation needs - non-medical: None   Occupational History     None   Tobacco Use     Smoking status: Former Smoker     Packs/day: 2.00     Years: 45.00     Pack years: 90.00     Last attempt to quit: 6/22/2003     Years since quitting: 15.5     Smokeless tobacco: Never Used   Substance and Sexual Activity     Alcohol use: None     Drug use: None     Sexual activity: None   Other Topics Concern     None   Social History Narrative     None       REVIEW OF SYSTEM:  Pertinent items are noted in HPI.  A 12 point comprehensive review of systems was negative except as noted.  ROS limited due to patient's cognitive impairment.    PHYSICAL EXAM:   BP (!) 162/98   Pulse 84   Temp 98.1  F (36.7  C)   Resp 18   SpO2 93%     General Appearance:    Alert, cooperative, no distress, appears stated age   Head:    Normocephalic, without obvious abnormality, atraumatic   Eyes:    PERRL, conjunctiva/corneas clear, both eyes        Ears:    Normal external ear canals, both ears   Nose:   Nares normal, septum midline, mucosa normal, no drainage    or sinus tenderness   Throat:   Lips,  mucosa, and tongue normal; teeth and gums normal   Neck:   Supple, symmetrical, trachea midline   Back:     Symmetric, no curvature, ROM normal   Lungs:     Expiratory wheezing in all lung fields, respirations unlabored   Chest wall:    No tenderness or deformity   Heart:    Regular rate and rhythm, S1 and S2 normal, no murmur, rub   or gallop   Abdomen:     Soft, non-tender, bowel sounds active all four quadrants,     no masses, no organomegaly   Extremities:   Extremities normal, atraumatic, no cyanosis, mild nonpitting edema in BLE   Pulses:   2+ and symmetric all extremities   Skin:   Skin color, texture, turgor normal, no rashes or lesions   Lymph nodes:   Cervical, supraclavicular, and axillary nodes normal   Neurologic:   Bilateral lower extremity weakness         LABS:   Results for orders placed or performed during the hospital encounter of 12/28/18   Basic Metabolic Panel   Result Value Ref Range    Sodium 141 136 - 145 mmol/L    Potassium 4.0 3.5 - 5.0 mmol/L    Chloride 107 98 - 107 mmol/L    CO2 23 22 - 31 mmol/L    Anion Gap, Calculation 11 5 - 18 mmol/L    Glucose 210 (H) 70 - 125 mg/dL    Calcium 9.1 8.5 - 10.5 mg/dL    BUN 12 8 - 28 mg/dL    Creatinine 1.17 0.70 - 1.30 mg/dL    GFR MDRD Af Amer >60 >60 mL/min/1.73m2    GFR MDRD Non Af Amer 59 (L) >60 mL/min/1.73m2       Ordered BMP, CBC, vitamin D, and vitamin B12 at this visit.    ASSESSMENT:      ICD-10-CM    1. Hypertension I10    2. Coronary artery disease due to lipid rich plaque I25.10     I25.83    3. Hypercholesteremia E78.00    4. 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 Chronic obstructive pulmonary disease with acute exacerbation (H) J44.1    6. Influenza A H1N1 infection J10.1    7. Atrial tachycardia, multifocal (H) I47.1    8. Physical deconditioning R53.81      Assessment/Plan:    COPD excerebration with Influenza A/H1N1 infection: received tamiflu in hospital, still has respiratory issues, continue nebs and prednisone burst  x3d    A-fib/atrial tachycardia: continue diltiazem 120mg daily, rate controlled    BPH: continue tamsulosin 0.4mg daily, no change    Insomnia: continue melatonin 3mg at HS    Hyperlipidemia: continue atorvastatin 40mg at HS    Disposition: yet to be decided, waiting for official cognitive scores and therapy recommendation      Total 35 minutes of which 75% was spent counseling and coordination of care of the above plan.     Patient evaluated by Bishnu Alonso CNP in conjunction with Clara Sheikh CNP, who scribed note. Bishnu Alonso CNP, then edited note. Plan agreed upon by patient, float nurse practitioner and nurse practitioner.     Electronically signed by: Bishnu Alonso NP

## 2021-06-23 NOTE — TELEPHONE ENCOUNTER
Patient Returning Call  Reason for call:  Jane from PT called back.  Information relayed to patient:  n/a  Patient has additional questions:  Yes     If YES, what are your questions/concerns:  Jane wanted to add to the verbal orders to get parameters for blood pressures, heart rate, and O2 sats for patient.       Okay to leave a detailed message?: Yes

## 2021-06-23 NOTE — TELEPHONE ENCOUNTER
Who is calling:  [facility]  Reason for Call:  Caller stated patient's wife told her patient has type 2 diabetes. Caller wanted to confirm this. Caller was informed patient does have type 2 diabetes listed on his problem list.  Date of last appointment with primary care: 3/9/18  Has the patient been recently seen:  No  Okay to leave a detailed message: No

## 2021-06-23 NOTE — TELEPHONE ENCOUNTER
Orders being requested: Occupational eval and treat done on 1/21/19. No further visits recommended.  Reason service is needed/diagnosis: no further visits needed  When are orders needed by: ASAP  Where to send Orders: Phone:  [phone]  Okay to leave detailed message?  Yes        Orders being requested: D/C remaining 2 visits with home health aid  Reason service is needed/diagnosis: services no longer needed.  When are orders needed by: ASA  Where to send Orders: Phone:  [phone]  Okay to leave detailed message?  Yes

## 2021-06-23 NOTE — TELEPHONE ENCOUNTER
Called and addressed Jane's questions.  Verbal orders for vitals parameters were given.  Verbal given for melatonin as needed, PT and home health as indicated in her request.  It does not sound as if patient has gone on home hospice at this time, encouraged office visit to address goals of care.  Estefanía Plaza MD

## 2021-06-23 NOTE — PROGRESS NOTES
"Assessment/plan   Delta Barrera is a 84 y.o. male who is new to my practice.    Delta was seen today for establish care and follow-up.    Diagnoses and all orders for this visit:    History of recent hospitalization for influenza A and COPD exacerbation; now resolved.    Chronic obstructive pulmonary disease, unspecified COPD type (H)  Not optimally controlled.  Patient's and his wife declined use of maintenance inhaler for COPD management.  They feel very much he is going to \"wait for what ever got has in store for me \"and do not find the inhalers helpful and too expensive.  Discussed use of albuterol at least if needed for symptomatic improvement.  Patient is currently doing well however.     Atrial tachycardia, multifocal (H), resolved.   Was initiated on diltiazem 120 mg once daily during his hospitalization and while at the TCU.  Upon discharge, due to unclear DC med instructions, patient and his wife were unaware that this medication was discontinued for them.  He has not been on this medicine in the last 2 weeks and feels well without tachycardia.   I did call the TCU and confirmed he was getting this medication while there, and called the pharmacy and confirmed he did not  the medication upon discharge.    Given that he is symptom-free, normal heart rate discussed to continue holding off on further treatment.   Anticipate the tachycardia was likely secondary to his acute febrile illness.  I confirmed with the pharmacy to discontinue the prescription for the diltiazem.  I also confirmed with pt's wife this plan. She was appreciative and agreed.    Type 2 diabetes mellitus with hyperglycemia, without long-term current use of insulin (H)  A1c is still meeting goal of less than 8% given his age, without use of medication.   Pt prefers to check in 1 year- due June 2018    HTN, Coronary artery disease due to lipid rich plaque  Blood pressure is well controlled and meeting goal of <140/90 mm Hg per " "JNC-8 hypertension guidelines.   Discussed ok to restart his atorvastatin at this time as he is not taking the diltiazem.    Aneurysm Of The Abdominal Aorta  Pt reported US from 2012 or earlier was dx as a \"large and inoperable aneurysm\". Refused further eval or follow up US imaging of this.  I do not have access to this record to see the formal report.  Continues to decline further evaluation despite conversation today.    Benign prostatic hyperplasia  Well controlled on medication regimen of flomax and finasteride. Prescribed by Dr. Manuel, Urology      I spent 40 minutes with the patient, >50% of which was in counseling regarding the patient's medical issues as noted above.    Follow up: 3 months, consider A1c conversation again at that time    Estefanía Plaza MD    Subjective:      HPI: Delta Barrera is a 84 y.o. male who is here for:    Chief Complaint   Patient presents with     Establish Care     Follow-up     INF [facility] 1/1-1/8, TCU 1/8-1/15, Flu & bladder infection       Delta is a 84 y.o. male with recent history of hospitalization at [facility] from 12/28/18-1/1/19 for influenza A and COPD exacerbation.  The hospital summary is as follows:     84-year-old male with history of dementia, COPD, chronic hypoxic respiratory failure (refused supplemental oxygen) presented to ER with fever and shortness of breath.  He had lactic acidosis on admission. He was diagnosed with sepsis and COPD exacerbation 2/2 influenza A. Resuscitated with IV fluid and started on Tamiflu, systemic steroid and nebulizer. He had multifocal atrial tachycardia with rapid ventricular response on admission.  Started on Cardizem.  Patient has history of dementia, was agitated in the floor, required one-to-one sitter.     He eventually discharged to TCU in a stable. Plan was for d/c to hospice but family declined.    Since discharge: \"I feel like a million dollars\" now. Denies any significant shortness of breath.  Initially " "were planning for hospice cares but since he had a big improvement and declined this. He has physical therapy sessions which are nearing the end. Doing well.     Stopped the atorvastatin.   Wife indicates she is not aware of any diltiazem medication. She indicates he hasn't been on the diltiazem since leaving the TCU on 1/11.   Denies racing heart beat.     COPD excerebration with Influenza A/H1N1 infection: received tamiflu in hospital, had nebs and prednisone in the TCU. Patient states he is not using any maintenance inhalers nor even the albuterol rescue inhaler- hasn't been for years.  He has a history of smoking, quit 20 years ago.  He and his wife both feel inhalers are not helpful for his breathing and prefer to remain off of these. Has albuterol available but hasn't needed since discharge. Overall feeling much better.      A-fib/atrial tachycardia: The patient states he and his wife were unaware about this new heart medication called diltiazem.  He reports not taking this medication since discharge from the TCU on 11/11.       BPH: continues tamsulosin 0.4mg daily, and finasteride 5mg daily per Dr. Manuel.  Denies any BPH symptoms or issues with urination.  Has a history of bladder stones.     Vit D deficiency: last level 9.2, started on D3 3000U daily during TCU stay     DM2: last A1c 7.8 in June, oral intake variable, diet \"controlled\". Declines meds or re-eval today.      Insomnia: has prn melatonin 3mg at HS     CAD/Hyperlipidemia: History of bare-metal stent in 2012.  in the past week he has been holding the atorvastatin 40mg at HS at the recommendation of his care guide Jane.  This was actually because she had called me with regard to the interaction between diltiazem and atorvastatin.  Patient was unaware that he should be on this other heart medication but has been holding the statin as instructed until today's visit so we could address this.    Review of Systems:  H/o AAA and states it was last " "imaged in 2012 or earlier and \"so big it would be terminal if they operated\". Not interested in further eval.   12 point comprehensive review of systems was negative except as noted and HPI     Social History:  Social History     Social History Narrative     54 years. Many children. Lives in [facility] single story unit.     Former smoker.        Medical History:  Patient Active Problem List   Diagnosis     Hypertension     Coronary artery disease due to lipid rich plaque     Hypercholesteremia     Aneurysm Of The Abdominal Aorta     COPD (chronic obstructive pulmonary disease) (H)     Atrial tachycardia, multifocal (H)     Type 2 diabetes mellitus with hyperglycemia, without long-term current use of insulin (H)     Benign prostatic hyperplasia with urinary retention     Dementia without behavioral disturbance     Past Medical History:   Diagnosis Date     COPD (chronic obstructive pulmonary disease) (H)     Created by Conversion      Dementia      Diverticulosis     Created by Conversion  Replacement Utility updated for latest IMO load     Goals of care, counseling/discussion      Hypercholesteremia     Created by Conversion      Hypertension     Created by Conversion  Replacement Utility updated for latest IMO load     Influenza A H1N1 infection 12/28/2018     Past Surgical History:   Procedure Laterality Date     TX HEMORRHOIDECTOMY INTERNAL RUBBER BAND LIGATIONS      Description: Hemorrhoidectomy;  Recorded: 05/29/2013;  Comments: September 2004- 2 quadrant  hemmoroidectomy and proctoplasty of the rectal mucosa prolapse.     TX REMOVAL OF TONSILS,<11 Y/O      Description: Tonsillectomy;  Recorded: 09/10/2012;  Comments: tosilectomy as a child     TX REMOVE BLADDER STONE,<2.5 CM      Description: Cystoscopy With Fragmentation Of Bladder Calculus;  Recorded: 03/01/2013;  Comments: May 2010 by Dr. Florez at [facility] ; ; November 20th 2012 by Dr. Garner At [facility]     Patient has no known " allergies.  No family history on file.    Medications:  Current Outpatient Medications   Medication Sig Dispense Refill     aspirin 81 MG EC tablet Take 81 mg by mouth daily.       cholecalciferol, vitamin D3, 1,000 unit tablet Take 3,000 Units by mouth daily.       dorzolamide-timolol (COSOPT) 22.3-6.8 mg/mL ophthalmic solution Administer 1 drop to both eyes 2 (two) times a day.       latanoprost (XALATAN) 0.005 % ophthalmic solution Administer 1 drop to both eyes at bedtime.       tamsulosin (FLOMAX) 0.4 mg Cp24 TAKE ONE CAPSULE BY MOUTH ONCE DAILY 90 capsule 3     albuterol (PROVENTIL) 2.5 mg /3 mL (0.083 %) nebulizer solution Take 3 mL (2.5 mg total) by nebulization every 4 (four) hours as needed for shortness of breath.  0     albuterol (PROVENTIL) 2.5 mg /3 mL (0.083 %) nebulizer solution Take 2.5 mg by nebulization 2 (two) times a day.       atorvastatin (LIPITOR) 40 MG tablet TAKE 1 TABLET BY MOUTH ONCE DAILY 90 tablet 3     diltiazem (DILACOR XR) 120 MG 24 hr capsule Take 1 capsule (120 mg total) by mouth daily.  0     finasteride (PROSCAR) 5 mg tablet Take 1 tablet (5 mg total) by mouth daily. Prescribed by Dr. Manuel, Urology 30 tablet 2     melatonin 3 mg Tab tablet Take 1 tablet (3 mg total) by mouth at bedtime.  0     predniSONE (DELTASONE) 20 MG tablet 20 mg for three days then continue by 10 mg for another 3 days.  0     No current facility-administered medications for this visit.        Imaging & Labs reviewed this visit:   Hemoglobin A1c 7.8% June 2018.  January 7, 2019: BMP normal.  Vitamin D level is 9.2.  Vitamin B12 is normal at 630.  WBC of 13.1, likely due to steroid burst which was given prior to that time.    Objective:      Vitals:    01/24/19 1308   BP: 132/78   Pulse: 68   Weight: 201 lb (91.2 kg)       Physical Exam:     General: Alert, no acute distress.  Participates in the conversation but much confusion with regard to what he remembers from his hospital stay.  He does have at least  mild dementia evidenced today.  This is my first meeting with him.  HEENT: normocephalic conjunctivae are ro[place]. Oropharynx is moist and clear, without tonsillar hypertrophy, asymmetry, exudate or lesions.   Neck: supple without adenopathy   Lungs: Good aeration bilaterally surprisingly without wheezing, crackles or rhonchi.   Heart: regular rate and rhythm, normal S1 and S2, no murmurs  Abdomen: soft and nontender  Skin: clear without rash or lesions  Neuro: alert, interactive moving all extremities equally, normal muscle tone in all 4 extremities      Estefanía Plaza MD

## 2021-06-23 NOTE — TELEPHONE ENCOUNTER
FYI - Status Update  Who is Calling: Home Care, Jane from [facility]  Update: FYI, discharging from home care per wife's request. O2 sats stay in the 90's when patient uses his four wheeled walker.  If he uses his cane or no assisted device they drop to 80 and quickly recover when he stops. Walker was recommended to be used at all times.  Okay to leave a detailed message?:  No return call needed

## 2021-06-23 NOTE — TELEPHONE ENCOUNTER
Orders being requested: PT for twice a week for 4 weeks and once a week for 1 week  Reason service is needed/diagnosis: Influenza and weakness, patient was just discharged from the TCU  When are orders needed by: tomorrow  Where to send Orders: Phone:  [phone]  Okay to leave detailed message?  Yes        Orders being requested: Home health aid for one time a week for 3 weeks  Reason service is needed/diagnosis: to help with bathing  When are orders needed by: tomorrow  Where to send Orders: Phone:  [phone]  Okay to leave detailed message?  Yes        Orders being requested: melatonin  Reason service is needed/diagnosis: Patient's wife stated patient is sleeping fine without taking it. Please send an order for either a PRN order or to discontinue it  When are orders needed by: tomorrow  Where to send Orders: Phone:  [phone]  Okay to leave detailed message?  Yes

## 2021-06-23 NOTE — TELEPHONE ENCOUNTER
Called and addressed Jane's questions.  Verbal orders given to stop the atorvastatin due to class D interaction with diltiazem. Recommended she talk with family if they desire to switch to rosuvastatin or stay off statin based on goals of care (D/c summary from both hospital and TCU indicated he was going home with hospice though the nurse here states this is not the case at this time. ).   Verbal given to continue the finasteride, that this was also continued at the TCU based on the discharge summary.    Encouraged office visit to address goals of care.  Estefanía Plaza MD

## 2021-06-23 NOTE — TELEPHONE ENCOUNTER
Medication Question or Clarification  Who is calling: Other: [facility]  What medication are you calling about?: atorvastatin and diltiazem  What dose do you take?: n/a  How often are you taking the medication?: n/a  Who prescribed the medication?: Cortney Hernandez NP and Dr. Morrison  What is your question/concern?: Caller stated there is a drug interaction between these two medications. Please clarify if this is okay. Please call Jane back.  Pharmacy: n/a  Okay to leave a detailed message?: Yes  [phone]  Site CMT - Please call the pharmacy to obtain any additional needed information.    _______________________________    FYI - Status Update  Who is Calling: Home Care  Update: Caller stated patient was discharged from the TCU with albuterol sulfate neb solution. Caller stated patient does not have a neb machine and patient's wife stated patient will not be using the neb solution.    Caller also stated patient's wife started patient back on finasteride 5 mg, on her own. Caller stated she informed the wife to follow patient's discharge order. Caller stated patient's wife declined this and is continuing to administer the finasteride to the patient.  Okay to leave a detailed message?:  No return call needed for this message.

## 2021-06-23 NOTE — PROGRESS NOTES
[facility] Patient Outreach:     Discharge Date::  1/15/19      Reason for TCU stay (discharge diagnosis)::  Influenza A, COPD exacerbation, dementia      Are you feeling better, the same or worse since your discharge?:  Patient is feeling better          As part of your discharge plan, did they discuss home care with you?: Yes        Have your seen them yet, or are they scheduled to visit?: Yes                Do you have any follow up visits scheduled with your PCP or Specialist?:  No          I'm glad to hear you're doing well and we want you to continue to do well. Your PCP would like to see you for a follow-up visit. Can we help set that up for your today?: No (Patient has an appt with PCP on 1/24/19,which his wife says is soon enough.  )        (RN) Provided patient the PCP's phone number to call if they have any questions or concerns?: No

## 2021-06-27 NOTE — PROGRESS NOTES
Progress Notes by Bishnu Alonso NP at 1/11/2019 11:59 PM     Author: Bishnu Alonso NP Service: -- Author Type: Nurse Practitioner    Filed: 1/12/2019 12:47 PM Encounter Date: 1/11/2019 Status: Attested    : Bishnu Alonso NP (Nurse Practitioner) Cosigner: Sarahi Arechiga MBBS at 1/14/2019  2:47 PM    Attestation signed by Sarahi Arechiga MBBS at 1/14/2019  2:47 PM    Agree with dc plan                  [facility]    Facility:   [facility] [[id number]]   Code Status: DNR      CHIEF COMPLAINT/REASON FOR VISIT:  Chief Complaint   Patient presents with   ? Discharge Summary       HISTORY:      HPI: Delta is a 84 y.o. male with recent history of hospitalization at [facility] from 12/28/18-1/1/19 for influenza A and COPD exacerbation.  The hospital summary is as follows:    84-year-old male with history of dementia, COPD, chronic hypoxic respiratory failure (refused supplemental oxygen) presented to ER with fever and shortness of breath.  He had lactic acidosis on admission. He was diagnosed with sepsis and COPD exacerbation 2/2 influenza A. Resuscitated with IV fluid and started on Tamiflu, systemic steroid and nebbulizer. He had multifocal atrial tachycardia with rapid ventricular response on admission.  Started on Cardizem.  Patient has history of dementia, was agitated in the floor, required one-to-one sitter.     He eventually discharged to TCU in a stable. Hospice to follow the patient after discharge per family request.    Mr. Barrera is being seen today for a review of multiple medical conditions.  He is a well-groomed, pleasantly confused gentleman.  He was oriented to person, disoriented to place, time, and situation at this visit.  Mr. Barrera lives with his wife at home and is planning on discharging from TCU with home hospice.  The patient denies any concerns at this time.   PT reported that patient has been progressing well  with therapy for his BLE weakness.  The patient denied lightheadedness, dizziness, breathing difficulty, chest pain, palpitations, constipation, urinary symptoms, numbness or tingling in extremities, and pain.  No other issues reported. He will be discharged to home with services.       Past Medical History:   Diagnosis Date   ? COPD (chronic obstructive pulmonary disease) (H)     Created by Conversion    ? Dementia    ? Hypercholesteremia     Created by Conversion    ? Hypertension     Created by Conversion  Replacement Utility updated for latest IMO load             No family history on file.  Social History     Socioeconomic History   ? Marital status:      Spouse name: Not on file   ? Number of children: Not on file   ? Years of education: Not on file   ? Highest education level: Not on file   Social Needs   ? Financial resource strain: Not on file   ? Food insecurity - worry: Not on file   ? Food insecurity - inability: Not on file   ? Transportation needs - medical: Not on file   ? Transportation needs - non-medical: Not on file   Occupational History   ? Not on file   Tobacco Use   ? Smoking status: Former Smoker     Packs/day: 2.00     Years: 45.00     Pack years: 90.00     Last attempt to quit: 6/22/2003     Years since quitting: 15.5   ? Smokeless tobacco: Never Used   Substance and Sexual Activity   ? Alcohol use: Not on file   ? Drug use: Not on file   ? Sexual activity: Not on file   Other Topics Concern   ? Not on file   Social History Narrative   ? Not on file       Current Outpatient Medications on File Prior to Visit   Medication Sig Dispense Refill   ? cholecalciferol, vitamin D3, 1,000 unit tablet Take 3,000 Units by mouth daily.     ? albuterol (PROVENTIL) 2.5 mg /3 mL (0.083 %) nebulizer solution Take 3 mL (2.5 mg total) by nebulization every 4 (four) hours as needed for shortness of breath.  0   ? albuterol (PROVENTIL) 2.5 mg /3 mL (0.083 %) nebulizer solution Take 2.5 mg by nebulization  2 (two) times a day.     ? aspirin 81 MG EC tablet Take 81 mg by mouth daily.     ? atorvastatin (LIPITOR) 40 MG tablet TAKE 1 TABLET BY MOUTH ONCE DAILY 90 tablet 3   ? diltiazem (DILACOR XR) 120 MG 24 hr capsule Take 1 capsule (120 mg total) by mouth daily.  0   ? dorzolamide-timolol (COSOPT) 22.3-6.8 mg/mL ophthalmic solution Administer 1 drop to both eyes 2 (two) times a day.     ? latanoprost (XALATAN) 0.005 % ophthalmic solution Administer 1 drop to both eyes at bedtime.     ? melatonin 3 mg Tab tablet Take 1 tablet (3 mg total) by mouth at bedtime.  0   ? predniSONE (DELTASONE) 20 MG tablet 20 mg for three days then continue by 10 mg for another 3 days.  0   ? tamsulosin (FLOMAX) 0.4 mg Cp24 TAKE ONE CAPSULE BY MOUTH ONCE DAILY 90 capsule 3     No current facility-administered medications on file prior to visit.            REVIEW OF SYSTEM:  Pertinent items are noted in HPI.  A 12 point comprehensive review of systems was negative except as noted.  ROS limited due to patient's cognitive impairment.    PHYSICAL EXAM:   /89   Pulse 78   Temp 97  F (36.1  C)   Resp 16   Wt 201 lb (91.2 kg)   SpO2 99%   BMI 31.48 kg/m      General Appearance:    Alert, cooperative, no distress, appears stated age   Head:    Normocephalic, without obvious abnormality, atraumatic   Eyes:    PERRL, conjunctiva/corneas clear, both eyes        Ears:    Normal external ear canals, both ears   Nose:   Nares normal, septum midline, mucosa normal, no drainage    or sinus tenderness   Throat:   Lips, mucosa, and tongue normal; teeth and gums normal   Neck:   Supple, symmetrical, trachea midline   Back:     Symmetric, no curvature, ROM normal   Lungs:     Expiratory wheezing in all lung fields, respirations unlabored   Chest wall:    No tenderness or deformity   Heart:    Regular rate and rhythm, S1 and S2 normal, no murmur, rub   or gallop   Abdomen:     Soft, non-tender, bowel sounds active all four quadrants,     no masses,  no organomegaly   Extremities:   Extremities normal, atraumatic, no cyanosis, mild nonpitting edema in BLE   Pulses:   2+ and symmetric all extremities   Skin:   Skin color, texture, turgor normal, no rashes or lesions   Lymph nodes:   Cervical, supraclavicular, and axillary nodes normal   Neurologic:   Bilateral lower extremity weakness         LABS:   Results for orders placed or performed in visit on 01/07/19   Basic Metabolic Panel   Result Value Ref Range    Sodium 140 136 - 145 mmol/L    Potassium 3.6 3.5 - 5.0 mmol/L    Chloride 105 98 - 107 mmol/L    CO2 24 22 - 31 mmol/L    Anion Gap, Calculation 11 5 - 18 mmol/L    Glucose 265 (H) 70 - 125 mg/dL    Calcium 8.3 (L) 8.5 - 10.5 mg/dL    BUN 18 8 - 28 mg/dL    Creatinine 0.84 0.70 - 1.30 mg/dL    GFR MDRD Af Amer >60 >60 mL/min/1.73m2    GFR MDRD Non Af Amer >60 >60 mL/min/1.73m2     Lab Results   Component Value Date    WBC 13.1 (H) 01/07/2019    HGB 15.4 01/07/2019    HCT 46.8 01/07/2019    MCV 92 01/07/2019     01/07/2019       Assessment/Plan:    COPD excerebration with Influenza A/H1N1 infection: received tamiflu in hospital, still has respiratory issues, continue nebs and prednisone burst x3d, resolved    Leukocytosis: last WBC 13.1, probably d/t steroid burst, afebrile    A-fib/atrial tachycardia: continue diltiazem 120mg daily, rate controlled, HR <80    BPH: continue tamsulosin 0.4mg daily, no change    Vit D def: last level 9.2, start D3 3000U daily    DM: last A1c 7.8, oral intake variable, diet controlled    Insomnia: continue melatonin 3mg at HS    Hyperlipidemia: continue atorvastatin 40mg at HS    MEDICAL EQUIPMENT NEEDS:  na    DISCHARGE PLAN/FACE TO FACE:  I certify that services are/were furnished while this patient was under the care of a physician and that a physician or an allowed non-physician practitioner (NPP), had a face-to-face encounter that meets the physician face-to-face encounter requirements. The encounter was in whole,  or in part, related to the primary reason for home health. The patient is confined to his/her home and needs intermittent skilled nursing, physical therapy, speech-language pathology, or the continued need for occupational therapy. A plan of care has been established by a physician and is periodically reviewed by a physician.  Date of Face-to-Face Encounter: 1/11/19    I certify that, based on my findings, the following services are medically necessary home health services: [facility] HHA/RN and PT/OT to evaluate and treat at home.     My clinical findings support the need for the above skilled services because: patient will be discharging to home. Patient will need assistance with medication management and performing IADLs and ADLs effectively and safely at home.    Patient to re-establish plan of care with their PCP within 7 days after leaving TCU.     The care plan has been reviewed and all orders signed. Changes to care plan, if any, as noted. Otherwise, continue care plan of care.  The total time spent with this patient was greater than 40 minutes, with greater than 50% spent in counseling and coordination of care that included multiple issues per discharge.    Electronically signed by: Bishnu Alonso NP

## 2021-06-28 NOTE — PROGRESS NOTES
"Progress Notes by Isabela Woods CNP at 2020  6:55 AM     Author: Isabela Woods CNP Service: -- Author Type: Nurse Practitioner    Filed: 2020 11:40 AM Encounter Date: 2020 Status: Signed    : Isabela Woods CNP (Nurse Practitioner)       [facility]      NAME:  Delta Barrera             :  1934  MRN: [id number]  CODE STATUS:  FULL CODE    VISIT TYPE: DISCHARGE SUMMARY  FACILYTY: [facility] [[id number]]    HOSPITALIZATION: [facility]   TO 2020                  PRIMARY CARE PROVIDER: Estefanía Plaza MD    DISCHARGE DIAGNOSIS:      1. Dementia without behavioral disturbance, unspecified dementia type (H)    2. Cellulitis of left upper extremity         DISCHARGE MEDICATIONS:         Medication List          Accurate as of 2020 11:34 AM. If you have any questions, ask your nurse or doctor.            CONTINUE taking these medications    aspirin 81 MG EC tablet     atorvastatin 40 MG tablet  Commonly known as:  LIPITOR  TAKE 1 TABLET BY MOUTH ONCE DAILY     brimonidine 0.15 % ophthalmic solution  Commonly known as:  ALPHAGAN     dorzolamide-timolol 22.3-6.8 mg/mL ophthalmic solution  Commonly known as:  COSOPT     finasteride 5 mg tablet  Commonly known as:  PROSCAR     latanoprost 0.005 % ophthalmic solution  Commonly known as:  XALATAN     metoprolol succinate 25 MG  Commonly known as:  TOPROL-XL     tamsulosin 0.4 mg Cap  Commonly known as:  FLOMAX  TAKE ONE CAPSULE BY MOUTH ONCE DAILY            HISTORY OF PRESENT ILLNESS: Delta Barrera is a 85 y.o. male is being seen today for a face to face visit for an anticipated dc to [facility] on 20.He comes from the [facility] after a stay from  to  where he presented due to a cellulitis to left arm.  Per EMR \"85 y old male with history of dementia, atrial fibrillation, emphysema, type 2 diabetes, hypertension, coronary artery disease, hyperlipidemia " "and BPH presented with progressive erythema of left upper extremity, started after the injury to his left elbow following a mechanical fall few days before arrival.  He was diagnosed with cellulitis, started on cefazolin. Responded well, he was eventually discharged with Keflex. Betamethasone topical added to her regimen for dermatitis and itchy skin of his forearm \". Although he has dementia he is awake and alert, able to answer most questions. He can ambulate with walker and SBA, using wc for long distance mobility.    SKILLED NURSING FACILITY COURSE:  During this TCU stay, patient completed all anticipated goals of therapy.      PHYSICAL EXAMINATION:    Vitals:    01/22/20 1130   BP: 130/78   Pulse: 78   Temp: 97.6  F (36.4  C)   Weight: 196 lb 3.2 oz (89 kg)         GENERAL: Awake, Alert, oriented , not in any form of acute distress, answers questions appropriately, follows simple commands, conversant  HEENT: Head is normocephalic with normal hair distribution. No evidence of trauma. Ears: No acute purulent discharge. Eyes: Conjunctivae pink with no scleral jaundice. Nose: Normal mucosa and septum. NECK: Supple with no cervical or supraclavicular lymphadenopathy. Trachea is midline.   CHEST: No tenderness or deformity, no crepitus  LUNG: Clear to auscultation with good chest expansion. There are no crackles or wheezes, normal AP diameter.  BACK: No kyphosis of the thoracic spine. Symmetric, no curvature, ROM normal, no CVA tenderness, no spinal tenderness   CVS: There is good S1  S2, rhythm is regular.  ABDOMEN: Globular and soft, nontender to palpation, non distended, no masses, no organomegaly, good bowel sounds, no rebound or guarding, no peritoneal signs.   EXTREMITIES: Atraumatic. Full range of motion on both upper and lower extremities, there is no tenderness to palpation, trace pedal edema, no cyanosis or clubbing, no calf tenderness, normal cap refill, no joint swelling.Adonis vick , wears compression, " faded red  SKIN: Warm and dry, no erythema noted, no rashes or lesions.Scabbed areas to his left elbow  NEUROLOGICAL: The patient is oriented to person, place , cognition impairments  LABS:  All labs reviewed in the nursing home record.        DISCHARGE PLAN: I certify that this patient is under Dr. Arechiga's care, seen by the NP, and had a face-to-face encounter that meets the physician face-to-face encounter requirements on 1/22/20.  The encounter was in whole, or part related to the primary reason for home health.  The Patient is homebound due to: Deconditioned state, left arm cellulitis and  it is, taxing and it will take a considerable amount of effort for patient to leave the home.  He is dependent on others for transportation.  The patient is confined to his home and needs intermittent skilled nursing, PT,OT, RN, and HHA.  The patient has been under the care of Dr. Arechiga/NP and Dr. Arechiga  initiated the establishment of the plan of care.        Patient to be followed by home care for physical therapy to eval and treat for strengthening, balance, endurance, and safety with mobility, and ambulation.  Patient to be followed by home care for occupational therapy to eval and treat for strengthening, ADL needs, adaptive equipment, and safety.  Patient to be followed by home care for nursing services for medication set up and teaching, symptom and disease processes monitoring and education.    Patient to be followed by home care for home health aid services for bathing and ADL needs.  Planned discharge.  All therapy goals have been met.  Family will assist with discharge and transportation.    Post Discharge Medication Reconciliation Status: discharge medications reconciled and changed, per note/orders (see AVS)    Patient will follow up with PCP within 7- days after discharge for medication mangagment and appropriate lab studies.          Electronically signed by:  Isabela Woods CNP  This progress note was  completed using Dragon software and there may be grammatical errors.      For documentation purposes, chart review, medication management, and discharge coordination of care was greater than 35 minutes

## 2021-07-03 NOTE — ADDENDUM NOTE
Addendum Note by María Santiago, PharmPRANAY at 3/9/2018 12:24 PM     Author: María Santiago, Juany Service: -- Author Type: Pharmacist    Filed: 3/9/2018 12:24 PM Encounter Date: 3/9/2018 Status: Signed    : María Santiago PharmD (Pharmacist)    Addended by: MARÍA SANTIAGO on: 3/9/2018 12:24 PM        Modules accepted: Orders

## 2021-08-03 PROBLEM — A41.9 SEPSIS (H): Status: RESOLVED | Noted: 2018-12-28 | Resolved: 2019-01-24

## 2021-08-07 ENCOUNTER — HOSPITAL ENCOUNTER (EMERGENCY)
Facility: CLINIC | Age: 86
Discharge: HOME | End: 2021-08-07
Attending: EMERGENCY MEDICINE | Admitting: EMERGENCY MEDICINE
Payer: MEDICARE

## 2021-08-07 ENCOUNTER — APPOINTMENT (OUTPATIENT)
Dept: CT IMAGING | Facility: CLINIC | Age: 86
End: 2021-08-07
Attending: EMERGENCY MEDICINE
Payer: MEDICARE

## 2021-08-07 VITALS
WEIGHT: 189 LBS | TEMPERATURE: 97.7 F | DIASTOLIC BLOOD PRESSURE: 88 MMHG | OXYGEN SATURATION: 99 % | HEART RATE: 57 BPM | HEIGHT: 68 IN | BODY MASS INDEX: 28.64 KG/M2 | SYSTOLIC BLOOD PRESSURE: 158 MMHG | RESPIRATION RATE: 18 BRPM

## 2021-08-07 DIAGNOSIS — S01.81XA LACERATION OF FOREHEAD, INITIAL ENCOUNTER: ICD-10-CM

## 2021-08-07 DIAGNOSIS — S09.90XA ACUTE HEAD INJURY, INITIAL ENCOUNTER: ICD-10-CM

## 2021-08-07 LAB
ATRIAL RATE - MUSE: 65 BPM
DIASTOLIC BLOOD PRESSURE - MUSE: 92 MMHG
INTERPRETATION ECG - MUSE: NORMAL
P AXIS - MUSE: 85 DEGREES
PR INTERVAL - MUSE: 208 MS
QRS DURATION - MUSE: 122 MS
QT - MUSE: 480 MS
QTC - MUSE: 499 MS
R AXIS - MUSE: 84 DEGREES
SYSTOLIC BLOOD PRESSURE - MUSE: 149 MMHG
T AXIS - MUSE: 74 DEGREES
VENTRICULAR RATE- MUSE: 65 BPM

## 2021-08-07 PROCEDURE — 93005 ELECTROCARDIOGRAM TRACING: CPT | Performed by: EMERGENCY MEDICINE

## 2021-08-07 PROCEDURE — 250N000011 HC RX IP 250 OP 636: Performed by: EMERGENCY MEDICINE

## 2021-08-07 PROCEDURE — 12011 RPR F/E/E/N/L/M 2.5 CM/<: CPT

## 2021-08-07 PROCEDURE — 272N000047 HC ADHESIVE DERMABOND SKIN

## 2021-08-07 PROCEDURE — 99285 EMERGENCY DEPT VISIT HI MDM: CPT | Mod: 25

## 2021-08-07 PROCEDURE — 70450 CT HEAD/BRAIN W/O DYE: CPT

## 2021-08-07 PROCEDURE — 250N000009 HC RX 250: Performed by: EMERGENCY MEDICINE

## 2021-08-07 RX ADMIN — EPINEPHRINE BITARTRATE 3 ML: 1 POWDER at 13:00

## 2021-08-07 ASSESSMENT — ENCOUNTER SYMPTOMS
WOUND: 1
NECK PAIN: 0
ABDOMINAL PAIN: 0
FEVER: 0
BACK PAIN: 0
VOMITING: 0
SHORTNESS OF BREATH: 0

## 2021-08-07 ASSESSMENT — MIFFLIN-ST. JEOR: SCORE: 1511.8

## 2021-08-07 NOTE — ED NOTES
Bed: [facility]11  Expected date: 8/7/21  Expected time:   Means of arrival: Ambulance  Comments:  85 yo male fall from wheelchair

## 2021-08-07 NOTE — ED PROVIDER NOTES
Emergency Department Encounter      NAME: Delta Barrera  AGE: 86 year old male  YOB: 1934  MRN: [id number]  EVALUATION DATE & TIME: 2021 12:23 PM    PCP: Cornel Calderon    ED PROVIDER: Bertram Sanchez M.D.      Chief Complaint   Patient presents with     Fall     Head Laceration         FINAL IMPRESSION:  1. Acute head injury, initial encounter    2. Laceration of forehead, initial encounter        MEDICAL DECISION MAKIN:35 PM I met with the patient, obtained history, performed an initial exam, and discussed options and plan for diagnostics and treatment here in the ED. I was wearing PPE including gloves and surgical mask.   1:38 PM I rechecked on the patient to repair his laceration- see procedure note.  1:50 PM We discussed the plan for discharge and the patient is agreeable. Reviewed supportive cares, symptomatic treatment, outpatient follow up, and reasons to return to the Emergency Department. Patient to be discharged by ED RN.     Pertinent Labs & Imaging studies reviewed. (See chart for details)     This patient is a 86-year-old male who is sent in from a memory care unit for evaluation of injuries from a fall.  She has a history of dementia, type 2 diabetes and COPD.  Today while he was sitting in his wheelchair he bent forward to  something and fell out of the wheelchair.  He says he was unsure if he hit his head on the floor or table but did not lose consciousness.  He sustained some mild abrasions to his left elbow and knee but sustained a contusion and laceration to his forehead.  A head CT was done which did not show any acute findings other than a scalp contusion.  The laceration was cleaned with Betadine and saline.  It was repaired with Dermabond.  The patient was discharged back to the memory care unit where they can monitor his mental status.      The importance of close follow up was discussed. We reviewed warning signs and symptoms, and I instructed   "Holly to return to the emergency department immediately if he develops any new or worsening symptoms. I provided additional verbal discharge instructions. Mr. Barrera expressed understanding and agreement with this plan of care, his questions were answered, and he was discharged in stable condition.     MEDICATIONS GIVEN IN THE EMERGENCY:  Medications   lidocaine/EPINEPHrine/tetracaine (LET) solution KIT (3 mLs Topical Given 8/7/21 1300)       NEW PRESCRIPTIONS STARTED AT TODAY'S ER VISIT:  New Prescriptions    No medications on file          =================================================================    HPI    Patient information was obtained from: Patient     Use of : N/A      Delta Barrera is a 86 year old male with a past medical history of dementia, type 2 diabetes, COPD, and essential hypertension, who presents to this ED via EMS from [facility] for evaluation of fall and laceration.     Per chart review,  Patient was seen in the ED after a fall 2/21/21. CBC and BMP unremarkable. Pelvic x-rays and Head CT unremarkable. Patient discharged back to [facility].    Patient states he was sitting in his wheelchair when he bent over to grab something and then fell and hit his head. Patient is unsure if he hit his head on the table or floor. He states he \"saw stars\" but denies loss of consciousness. Patient also obtained abrasions to his left elbow and left knee. He additionally notes left shoulder pain, but states this is chronic and has been present for multiple years. Patient is not on blood thinners. Patient denies any fevers, neck pan, back pain, abdominal pain, vomiting, or other symptoms at this time.       REVIEW OF SYSTEMS   Review of Systems   Constitutional: Negative for fever.   HENT:        Positive for hitting head.   Respiratory: Negative for shortness of breath.    Cardiovascular: Negative for chest pain.   Gastrointestinal: Negative for abdominal pain and vomiting. "   Musculoskeletal: Negative for back pain and neck pain.        Positive for left shoulder pain (chronic, unchanged).   Skin: Positive for wound (laceration to left forehead. Abrasions to left elbow and left knee.).   Neurological: Negative for syncope.   All other systems reviewed and are negative.       PAST MEDICAL HISTORY:  Past Medical History:   Diagnosis Date     COPD (chronic obstructive pulmonary disease) (H)     Created by Conversion      Dementia (H)      Diverticulosis of large intestine     Created by Conversion  Replacement Utility updated for latest IMO load     Essential hypertension     Created by Conversion  Replacement Utility updated for latest IMO load     Goals of care, counseling/discussion      Hypercholesteremia     Created by Conversion      Influenza A H1N1 infection 12/28/2018       PAST SURGICAL HISTORY:  Past Surgical History:   Procedure Laterality Date     HC LITHOLAPAXY BLADDER STONE <2.5CM      Description: Cystoscopy With Fragmentation Of Bladder Calculus;  Recorded: 03/01/2013;  Comments: May 2010 by Dr. Florez at [facility] ; ; November 20th 2012 by Dr. Garner At [facility] REMOVAL OF TONSILS,<11 Y/O      Description: Tonsillectomy;  Recorded: 09/10/2012;  Comments: tosilectomy as a child     HEMORRHOIDECTOMY INTERNAL LIGATION      Description: Hemorrhoidectomy;  Recorded: 05/29/2013;  Comments: September 2004- 2 quadrant  hemmoroidectomy and proctoplasty of the rectal mucosa prolapse.       CURRENT MEDICATIONS:    No current facility-administered medications for this encounter.    Current Outpatient Medications:      aspirin 81 MG EC tablet, [ASPIRIN 81 MG EC TABLET] Take 81 mg by mouth daily., Disp: , Rfl:      atorvastatin (LIPITOR) 40 MG tablet, [ATORVASTATIN (LIPITOR) 40 MG TABLET] TAKE 1 TABLET BY MOUTH ONCE DAILY, Disp: 90 tablet, Rfl: 3     brimonidine (ALPHAGAN) 0.15 % ophthalmic solution, [BRIMONIDINE (ALPHAGAN) 0.15 % OPHTHALMIC SOLUTION] Apply 1 drop to eye  2 (two) times a day., Disp: , Rfl: 11     dorzolamide-timolol (COSOPT) 22.3-6.8 mg/mL ophthalmic solution, [DORZOLAMIDE-TIMOLOL (COSOPT) 22.3-6.8 MG/ML OPHTHALMIC SOLUTION] Administer 1 drop to both eyes 2 (two) times a day., Disp: , Rfl:      finasteride (PROSCAR) 5 mg tablet, [FINASTERIDE (PROSCAR) 5 MG TABLET] Take 5 mg by mouth daily., Disp: , Rfl:      latanoprost (XALATAN) 0.005 % ophthalmic solution, [LATANOPROST (XALATAN) 0.005 % OPHTHALMIC SOLUTION] Administer 1 drop to both eyes at bedtime., Disp: , Rfl:      metoprolol succinate (TOPROL-XL) 25 MG, [METOPROLOL SUCCINATE (TOPROL-XL) 25 MG] Take 12.5 mg by mouth daily., Disp: , Rfl:      tamsulosin (FLOMAX) 0.4 mg Cp24, [TAMSULOSIN (FLOMAX) 0.4 MG CP24] TAKE ONE CAPSULE BY MOUTH ONCE DAILY, Disp: 90 capsule, Rfl: 3    ALLERGIES:  No Known Allergies    FAMILY HISTORY:  No family history on file.    SOCIAL HISTORY:   Social History     Socioeconomic History     Marital status:      Spouse name: Not on file     Number of children: Not on file     Years of education: Not on file     Highest education level: Not on file   Occupational History     Not on file   Tobacco Use     Smoking status: Former Smoker     Packs/day: 2.00     Years: 45.00     Pack years: 90.00     Quit date: 2003     Years since quittin.1     Smokeless tobacco: Never Used   Substance and Sexual Activity     Alcohol use: Not Currently     Drug use: Not Currently     Sexual activity: Not Currently     Partners: Female   Other Topics Concern     Not on file   Social History Narrative     54 years. Many children. Lives in [facility].   Former smoker.        Social Determinants of Health     Financial Resource Strain:      Difficulty of Paying Living Expenses:    Food Insecurity:      Worried About Running Out of Food in the Last Year:      Ran Out of Food in the Last Year:    Transportation Needs:      Lack of Transportation (Medical):       "Lack of Transportation (Non-Medical):    Physical Activity:      Days of Exercise per Week:      Minutes of Exercise per Session:    Stress:      Feeling of Stress :    Social Connections:      Frequency of Communication with Friends and Family:      Frequency of Social Gatherings with Friends and Family:      Attends Rastafarian Services:      Active Member of Clubs or Organizations:      Attends Club or Organization Meetings:      Marital Status:    Intimate Partner Violence:      Fear of Current or Ex-Partner:      Emotionally Abused:      Physically Abused:      Sexually Abused:        PHYSICAL EXAM:    Vitals: BP (!) 149/62   Pulse 60   Temp 97.7  F (36.5  C) (Oral)   Resp 18   Ht 1.727 m (5' 8\")   Wt 85.7 kg (189 lb)   SpO2 99%   BMI 28.74 kg/m     Constitutional: Well developed, well nourished. Comfortable appearing.  HENT: Normocephalic. 2 cm superficial laceration over left forehead. Mucous membranes moist, nose normal.   Neck- Supple, gross ROM intact.   Eyes: Pupils mid-range, sclera white, no discharge  Respiratory: Clear to auscultation bilaterally, no respiratory distress, no wheezing, speaks full sentences easily.  Cardiovascular: Normal heart rate, regular rhythm, no murmurs. No lower extremity edema, 2+ DP pulses.   GI: Soft, no tenderness to deep palpation in all quadrants, no masses.  Musculoskeletal: Moving all 4 extremities intentionally and without pain. No obvious deformity. Small abrasion to left knee. Multiple resolving bruises of various ages over bilateral arms.   Skin: Warm, dry, no rash.   Neurologic: Alert & oriented x 3, speech clear, moving all extremities spontaneously   Psychiatric: Affect normal, cooperative.     LAB:  All pertinent labs reviewed and interpreted.  Labs Ordered and Resulted from Time of ED Arrival Up to the Time of Departure from the ED - No data to display    RADIOLOGY:  CT Head w/o Contrast   Final Result   IMPRESSION:   1.  Soft tissue swelling involving the " left frontal scalp.   2.  No acute intracranial hemorrhage.   3.  Brain atrophy and presumed chronic microvascular ischemic changes as above.          PROCEDURES:   PROCEDURE: Laceration Repair   INDICATIONS: Laceration   PROCEDURE PROVIDER: Bertram Sanchez   SITE: Left forehead   TYPE/SIZE: superficial, clean and no foreign body visualized  2 cm (total length)   FUNCTIONAL ASSESSMENT: Distal sensation and circulation intact   MEDICATION: LET   PREPARATION: irrigation with Normal saline and Betadine   DEBRIDEMENT: no debridement   CLOSURE:  Dermabond.         I, Ewelina Pond, am serving as a scribe to document services personally performed by Dr. Bertram Sanchez based on my observation and the provider's statements to me. I, Bertram Sanchez M.D. attest that Ewelina Pond is acting in a scribe capacity, has observed my performance of the services and has documented them in accordance with my direction.      Bertram Sanchez M.D.  Emergency Medicine  [facility] EMERGENCY ROOM  [address] [phone]  Dept: [phone]       Bertram Sanchez MD  09/02/21 8216

## 2021-08-07 NOTE — ED TRIAGE NOTES
Patient was in wheelchair, got up to clean up a spilt beer that wasn't not there as reported by EMS. Patient fell hitting head with laceration, also abrasion to L elbow possibly present before fall, no thinners, patient back to baseline per memory care staff.

## 2021-10-07 ENCOUNTER — LAB REQUISITION (OUTPATIENT)
Dept: LAB | Facility: CLINIC | Age: 86
End: 2021-10-07
Payer: MEDICARE

## 2021-10-07 DIAGNOSIS — I10 ESSENTIAL (PRIMARY) HYPERTENSION: ICD-10-CM

## 2021-10-07 DIAGNOSIS — E11.9 TYPE 2 DIABETES MELLITUS WITHOUT COMPLICATIONS (H): ICD-10-CM

## 2021-10-08 LAB
ANION GAP SERPL CALCULATED.3IONS-SCNC: 8 MMOL/L (ref 5–18)
BASOPHILS # BLD AUTO: 0 10E3/UL (ref 0–0.2)
BASOPHILS NFR BLD AUTO: 1 %
BUN SERPL-MCNC: 15 MG/DL (ref 8–28)
CALCIUM SERPL-MCNC: 8.9 MG/DL (ref 8.5–10.5)
CHLORIDE BLD-SCNC: 109 MMOL/L (ref 98–107)
CO2 SERPL-SCNC: 23 MMOL/L (ref 22–31)
CREAT SERPL-MCNC: 0.93 MG/DL (ref 0.7–1.3)
EOSINOPHIL # BLD AUTO: 0.1 10E3/UL (ref 0–0.7)
EOSINOPHIL NFR BLD AUTO: 2 %
ERYTHROCYTE [DISTWIDTH] IN BLOOD BY AUTOMATED COUNT: 15.1 % (ref 10–15)
GFR SERPL CREATININE-BSD FRML MDRD: 74 ML/MIN/1.73M2
GLUCOSE BLD-MCNC: 186 MG/DL (ref 70–125)
HBA1C MFR BLD: 6.2 %
HCT VFR BLD AUTO: 44.1 % (ref 40–53)
HGB BLD-MCNC: 14.6 G/DL (ref 13.3–17.7)
IMM GRANULOCYTES # BLD: 0 10E3/UL
IMM GRANULOCYTES NFR BLD: 0 %
LYMPHOCYTES # BLD AUTO: 1.5 10E3/UL (ref 0.8–5.3)
LYMPHOCYTES NFR BLD AUTO: 20 %
MCH RBC QN AUTO: 30.5 PG (ref 26.5–33)
MCHC RBC AUTO-ENTMCNC: 33.1 G/DL (ref 31.5–36.5)
MCV RBC AUTO: 92 FL (ref 78–100)
MONOCYTES # BLD AUTO: 0.5 10E3/UL (ref 0–1.3)
MONOCYTES NFR BLD AUTO: 7 %
NEUTROPHILS # BLD AUTO: 5 10E3/UL (ref 1.6–8.3)
NEUTROPHILS NFR BLD AUTO: 70 %
NRBC # BLD AUTO: 0 10E3/UL
NRBC BLD AUTO-RTO: 0 /100
PLATELET # BLD AUTO: 175 10E3/UL (ref 150–450)
POTASSIUM BLD-SCNC: 3.9 MMOL/L (ref 3.5–5)
RBC # BLD AUTO: 4.79 10E6/UL (ref 4.4–5.9)
SODIUM SERPL-SCNC: 140 MMOL/L (ref 136–145)
WBC # BLD AUTO: 7.1 10E3/UL (ref 4–11)

## 2021-10-08 PROCEDURE — 36415 COLL VENOUS BLD VENIPUNCTURE: CPT | Mod: ORL | Performed by: PHYSICIAN ASSISTANT

## 2021-10-08 PROCEDURE — P9603 ONE-WAY ALLOW PRORATED MILES: HCPCS | Mod: ORL | Performed by: PHYSICIAN ASSISTANT

## 2021-10-08 PROCEDURE — 83036 HEMOGLOBIN GLYCOSYLATED A1C: CPT | Mod: ORL | Performed by: PHYSICIAN ASSISTANT

## 2021-10-08 PROCEDURE — 85025 COMPLETE CBC W/AUTO DIFF WBC: CPT | Mod: ORL | Performed by: PHYSICIAN ASSISTANT

## 2021-10-08 PROCEDURE — 80048 BASIC METABOLIC PNL TOTAL CA: CPT | Mod: ORL | Performed by: PHYSICIAN ASSISTANT

## 2022-01-04 PROCEDURE — U0003 INFECTIOUS AGENT DETECTION BY NUCLEIC ACID (DNA OR RNA); SEVERE ACUTE RESPIRATORY SYNDROME CORONAVIRUS 2 (SARS-COV-2) (CORONAVIRUS DISEASE [COVID-19]), AMPLIFIED PROBE TECHNIQUE, MAKING USE OF HIGH THROUGHPUT TECHNOLOGIES AS DESCRIBED BY CMS-2020-01-R: HCPCS | Mod: ORL | Performed by: PHYSICIAN ASSISTANT

## 2022-01-05 ENCOUNTER — LAB REQUISITION (OUTPATIENT)
Dept: LAB | Facility: CLINIC | Age: 87
End: 2022-01-05
Payer: MEDICARE

## 2022-01-05 DIAGNOSIS — R53.1 WEAKNESS: ICD-10-CM

## 2022-01-06 LAB — SARS-COV-2 RNA RESP QL NAA+PROBE: NEGATIVE

## 2022-01-07 PROCEDURE — 87086 URINE CULTURE/COLONY COUNT: CPT | Mod: ORL | Performed by: PHYSICIAN ASSISTANT

## 2022-01-07 PROCEDURE — 81001 URINALYSIS AUTO W/SCOPE: CPT | Mod: ORL | Performed by: PHYSICIAN ASSISTANT

## 2022-01-08 ENCOUNTER — LAB REQUISITION (OUTPATIENT)
Dept: LAB | Facility: CLINIC | Age: 87
End: 2022-01-08
Payer: MEDICARE

## 2022-01-08 DIAGNOSIS — R53.1 WEAKNESS: ICD-10-CM

## 2022-01-08 LAB
ALBUMIN UR-MCNC: 100 MG/DL
APPEARANCE UR: ABNORMAL
BACTERIA #/AREA URNS HPF: ABNORMAL /HPF
BILIRUB UR QL STRIP: NEGATIVE
COLOR UR AUTO: ABNORMAL
GLUCOSE UR STRIP-MCNC: NEGATIVE MG/DL
HGB UR QL STRIP: NEGATIVE
KETONES UR STRIP-MCNC: 10 MG/DL
LEUKOCYTE ESTERASE UR QL STRIP: ABNORMAL
NITRATE UR QL: POSITIVE
PH UR STRIP: 7 [PH] (ref 5–7)
RBC URINE: 9 /HPF
SP GR UR STRIP: 1.03 (ref 1–1.03)
TRI-PHOS CRY #/AREA URNS HPF: ABNORMAL /HPF
UROBILINOGEN UR STRIP-MCNC: 2 MG/DL
WBC CLUMPS #/AREA URNS HPF: PRESENT /HPF
WBC URINE: 57 /HPF

## 2022-01-09 LAB — BACTERIA UR CULT: ABNORMAL
